# Patient Record
Sex: MALE | Race: WHITE | NOT HISPANIC OR LATINO
[De-identification: names, ages, dates, MRNs, and addresses within clinical notes are randomized per-mention and may not be internally consistent; named-entity substitution may affect disease eponyms.]

---

## 2018-05-11 ENCOUNTER — APPOINTMENT (OUTPATIENT)
Dept: MRI IMAGING | Facility: CLINIC | Age: 18
End: 2018-05-11
Payer: COMMERCIAL

## 2018-05-11 ENCOUNTER — APPOINTMENT (OUTPATIENT)
Dept: RADIOLOGY | Facility: CLINIC | Age: 18
End: 2018-05-11
Payer: COMMERCIAL

## 2018-05-11 ENCOUNTER — OUTPATIENT (OUTPATIENT)
Dept: OUTPATIENT SERVICES | Facility: HOSPITAL | Age: 18
LOS: 1 days | End: 2018-05-11
Payer: COMMERCIAL

## 2018-05-11 DIAGNOSIS — Z00.8 ENCOUNTER FOR OTHER GENERAL EXAMINATION: ICD-10-CM

## 2018-05-11 PROCEDURE — 77002 NEEDLE LOCALIZATION BY XRAY: CPT

## 2018-05-11 PROCEDURE — 23350 INJECTION FOR SHOULDER X-RAY: CPT

## 2018-05-11 PROCEDURE — 73222 MRI JOINT UPR EXTREM W/DYE: CPT | Mod: 26,RT

## 2018-05-11 PROCEDURE — 73222 MRI JOINT UPR EXTREM W/DYE: CPT

## 2018-05-11 PROCEDURE — 77002 NEEDLE LOCALIZATION BY XRAY: CPT | Mod: 26

## 2018-12-13 ENCOUNTER — EMERGENCY (EMERGENCY)
Facility: HOSPITAL | Age: 18
LOS: 1 days | Discharge: HOME | End: 2018-12-13
Attending: EMERGENCY MEDICINE
Payer: COMMERCIAL

## 2018-12-13 VITALS
OXYGEN SATURATION: 98 % | TEMPERATURE: 99 F | RESPIRATION RATE: 24 BRPM | HEART RATE: 85 BPM | SYSTOLIC BLOOD PRESSURE: 133 MMHG | DIASTOLIC BLOOD PRESSURE: 77 MMHG

## 2018-12-13 VITALS
TEMPERATURE: 98 F | OXYGEN SATURATION: 99 % | DIASTOLIC BLOOD PRESSURE: 78 MMHG | HEART RATE: 87 BPM | RESPIRATION RATE: 18 BRPM | SYSTOLIC BLOOD PRESSURE: 128 MMHG

## 2018-12-13 LAB
ALBUMIN SERPL ELPH-MCNC: 4.4 G/DL — SIGNIFICANT CHANGE UP (ref 3.3–5)
ALP SERPL-CCNC: 57 U/L — SIGNIFICANT CHANGE UP (ref 40–120)
ALT FLD-CCNC: 15 U/L — SIGNIFICANT CHANGE UP (ref 10–45)
ANION GAP SERPL CALC-SCNC: 14 MMOL/L — SIGNIFICANT CHANGE UP (ref 5–17)
ANION GAP SERPL CALC-SCNC: 19 MMOL/L — HIGH (ref 5–17)
AST SERPL-CCNC: 22 U/L — SIGNIFICANT CHANGE UP (ref 10–40)
BASOPHILS # BLD AUTO: 0 K/UL — SIGNIFICANT CHANGE UP (ref 0–0.2)
BASOPHILS NFR BLD AUTO: 0.2 % — SIGNIFICANT CHANGE UP (ref 0–2)
BILIRUB SERPL-MCNC: 0.7 MG/DL — SIGNIFICANT CHANGE UP (ref 0.2–1.2)
BUN SERPL-MCNC: 12 MG/DL — SIGNIFICANT CHANGE UP (ref 7–23)
BUN SERPL-MCNC: 14 MG/DL — SIGNIFICANT CHANGE UP (ref 7–23)
CALCIUM SERPL-MCNC: 10.1 MG/DL — SIGNIFICANT CHANGE UP (ref 8.4–10.5)
CALCIUM SERPL-MCNC: 8.7 MG/DL — SIGNIFICANT CHANGE UP (ref 8.4–10.5)
CHLORIDE SERPL-SCNC: 103 MMOL/L — SIGNIFICANT CHANGE UP (ref 96–108)
CHLORIDE SERPL-SCNC: 108 MMOL/L — SIGNIFICANT CHANGE UP (ref 96–108)
CO2 SERPL-SCNC: 18 MMOL/L — LOW (ref 22–31)
CO2 SERPL-SCNC: 19 MMOL/L — LOW (ref 22–31)
CREAT SERPL-MCNC: 0.83 MG/DL — SIGNIFICANT CHANGE UP (ref 0.5–1.3)
CREAT SERPL-MCNC: 0.87 MG/DL — SIGNIFICANT CHANGE UP (ref 0.5–1.3)
EOSINOPHIL # BLD AUTO: 0.1 K/UL — SIGNIFICANT CHANGE UP (ref 0–0.5)
EOSINOPHIL NFR BLD AUTO: 0.5 % — SIGNIFICANT CHANGE UP (ref 0–6)
GAS PNL BLDV: SIGNIFICANT CHANGE UP
GAS PNL BLDV: SIGNIFICANT CHANGE UP
GLUCOSE SERPL-MCNC: 113 MG/DL — HIGH (ref 70–99)
GLUCOSE SERPL-MCNC: 117 MG/DL — HIGH (ref 70–99)
HCT VFR BLD CALC: 44.1 % — SIGNIFICANT CHANGE UP (ref 39–50)
HGB BLD-MCNC: 15.3 G/DL — SIGNIFICANT CHANGE UP (ref 13–17)
LIDOCAIN IGE QN: 23 U/L — SIGNIFICANT CHANGE UP (ref 7–60)
LYMPHOCYTES # BLD AUTO: 1.4 K/UL — SIGNIFICANT CHANGE UP (ref 1–3.3)
LYMPHOCYTES # BLD AUTO: 14.1 % — SIGNIFICANT CHANGE UP (ref 13–44)
MCHC RBC-ENTMCNC: 31.5 PG — SIGNIFICANT CHANGE UP (ref 27–34)
MCHC RBC-ENTMCNC: 34.7 GM/DL — SIGNIFICANT CHANGE UP (ref 32–36)
MCV RBC AUTO: 90.8 FL — SIGNIFICANT CHANGE UP (ref 80–100)
MONOCYTES # BLD AUTO: 1.2 K/UL — HIGH (ref 0–0.9)
MONOCYTES NFR BLD AUTO: 12.8 % — SIGNIFICANT CHANGE UP (ref 2–14)
NEUTROPHILS # BLD AUTO: 7.1 K/UL — SIGNIFICANT CHANGE UP (ref 1.8–7.4)
NEUTROPHILS NFR BLD AUTO: 72.4 % — SIGNIFICANT CHANGE UP (ref 43–77)
PLATELET # BLD AUTO: 357 K/UL — SIGNIFICANT CHANGE UP (ref 150–400)
POTASSIUM SERPL-MCNC: 3.5 MMOL/L — SIGNIFICANT CHANGE UP (ref 3.5–5.3)
POTASSIUM SERPL-MCNC: 4 MMOL/L — SIGNIFICANT CHANGE UP (ref 3.5–5.3)
POTASSIUM SERPL-SCNC: 3.5 MMOL/L — SIGNIFICANT CHANGE UP (ref 3.5–5.3)
POTASSIUM SERPL-SCNC: 4 MMOL/L — SIGNIFICANT CHANGE UP (ref 3.5–5.3)
PROT SERPL-MCNC: 8.1 G/DL — SIGNIFICANT CHANGE UP (ref 6–8.3)
RBC # BLD: 4.85 M/UL — SIGNIFICANT CHANGE UP (ref 4.2–5.8)
RBC # FLD: 12.3 % — SIGNIFICANT CHANGE UP (ref 10.3–14.5)
SODIUM SERPL-SCNC: 140 MMOL/L — SIGNIFICANT CHANGE UP (ref 135–145)
SODIUM SERPL-SCNC: 141 MMOL/L — SIGNIFICANT CHANGE UP (ref 135–145)
WBC # BLD: 9.8 K/UL — SIGNIFICANT CHANGE UP (ref 3.8–10.5)
WBC # FLD AUTO: 9.8 K/UL — SIGNIFICANT CHANGE UP (ref 3.8–10.5)

## 2018-12-13 PROCEDURE — 83605 ASSAY OF LACTIC ACID: CPT

## 2018-12-13 PROCEDURE — 74177 CT ABD & PELVIS W/CONTRAST: CPT

## 2018-12-13 PROCEDURE — 85014 HEMATOCRIT: CPT

## 2018-12-13 PROCEDURE — 80048 BASIC METABOLIC PNL TOTAL CA: CPT

## 2018-12-13 PROCEDURE — 84295 ASSAY OF SERUM SODIUM: CPT

## 2018-12-13 PROCEDURE — 82565 ASSAY OF CREATININE: CPT

## 2018-12-13 PROCEDURE — 70450 CT HEAD/BRAIN W/O DYE: CPT | Mod: 26

## 2018-12-13 PROCEDURE — 82435 ASSAY OF BLOOD CHLORIDE: CPT

## 2018-12-13 PROCEDURE — 74177 CT ABD & PELVIS W/CONTRAST: CPT | Mod: 26

## 2018-12-13 PROCEDURE — 96361 HYDRATE IV INFUSION ADD-ON: CPT

## 2018-12-13 PROCEDURE — 82803 BLOOD GASES ANY COMBINATION: CPT

## 2018-12-13 PROCEDURE — 96375 TX/PRO/DX INJ NEW DRUG ADDON: CPT

## 2018-12-13 PROCEDURE — 83690 ASSAY OF LIPASE: CPT

## 2018-12-13 PROCEDURE — 85027 COMPLETE CBC AUTOMATED: CPT

## 2018-12-13 PROCEDURE — 99284 EMERGENCY DEPT VISIT MOD MDM: CPT

## 2018-12-13 PROCEDURE — 96374 THER/PROPH/DIAG INJ IV PUSH: CPT | Mod: XU

## 2018-12-13 PROCEDURE — 71046 X-RAY EXAM CHEST 2 VIEWS: CPT | Mod: 26

## 2018-12-13 PROCEDURE — 71046 X-RAY EXAM CHEST 2 VIEWS: CPT

## 2018-12-13 PROCEDURE — 70450 CT HEAD/BRAIN W/O DYE: CPT

## 2018-12-13 PROCEDURE — 80053 COMPREHEN METABOLIC PANEL: CPT

## 2018-12-13 PROCEDURE — 99284 EMERGENCY DEPT VISIT MOD MDM: CPT | Mod: 25

## 2018-12-13 PROCEDURE — 84132 ASSAY OF SERUM POTASSIUM: CPT

## 2018-12-13 PROCEDURE — 82330 ASSAY OF CALCIUM: CPT

## 2018-12-13 PROCEDURE — 82947 ASSAY GLUCOSE BLOOD QUANT: CPT

## 2018-12-13 RX ORDER — DIPHENHYDRAMINE HCL 50 MG
25 CAPSULE ORAL ONCE
Qty: 0 | Refills: 0 | Status: COMPLETED | OUTPATIENT
Start: 2018-12-13 | End: 2018-12-13

## 2018-12-13 RX ORDER — ONDANSETRON 8 MG/1
4 TABLET, FILM COATED ORAL ONCE
Qty: 0 | Refills: 0 | Status: COMPLETED | OUTPATIENT
Start: 2018-12-13 | End: 2018-12-13

## 2018-12-13 RX ORDER — SODIUM CHLORIDE 9 MG/ML
1000 INJECTION INTRAMUSCULAR; INTRAVENOUS; SUBCUTANEOUS ONCE
Qty: 0 | Refills: 0 | Status: COMPLETED | OUTPATIENT
Start: 2018-12-13 | End: 2018-12-13

## 2018-12-13 RX ORDER — METOCLOPRAMIDE HCL 10 MG
10 TABLET ORAL ONCE
Qty: 0 | Refills: 0 | Status: COMPLETED | OUTPATIENT
Start: 2018-12-13 | End: 2018-12-13

## 2018-12-13 RX ORDER — ACETAMINOPHEN 500 MG
1000 TABLET ORAL ONCE
Qty: 0 | Refills: 0 | Status: COMPLETED | OUTPATIENT
Start: 2018-12-13 | End: 2018-12-13

## 2018-12-13 RX ORDER — FAMOTIDINE 10 MG/ML
20 INJECTION INTRAVENOUS ONCE
Qty: 0 | Refills: 0 | Status: COMPLETED | OUTPATIENT
Start: 2018-12-13 | End: 2018-12-13

## 2018-12-13 RX ADMIN — Medication 1000 MILLIGRAM(S): at 15:48

## 2018-12-13 RX ADMIN — Medication 10 MILLIGRAM(S): at 14:12

## 2018-12-13 RX ADMIN — SODIUM CHLORIDE 1000 MILLILITER(S): 9 INJECTION INTRAMUSCULAR; INTRAVENOUS; SUBCUTANEOUS at 15:45

## 2018-12-13 RX ADMIN — ONDANSETRON 4 MILLIGRAM(S): 8 TABLET, FILM COATED ORAL at 16:02

## 2018-12-13 RX ADMIN — SODIUM CHLORIDE 1000 MILLILITER(S): 9 INJECTION INTRAMUSCULAR; INTRAVENOUS; SUBCUTANEOUS at 14:14

## 2018-12-13 RX ADMIN — SODIUM CHLORIDE 1000 MILLILITER(S): 9 INJECTION INTRAMUSCULAR; INTRAVENOUS; SUBCUTANEOUS at 15:48

## 2018-12-13 RX ADMIN — Medication 400 MILLIGRAM(S): at 14:14

## 2018-12-13 RX ADMIN — FAMOTIDINE 20 MILLIGRAM(S): 10 INJECTION INTRAVENOUS at 15:45

## 2018-12-13 RX ADMIN — Medication 25 MILLIGRAM(S): at 14:13

## 2018-12-13 NOTE — ED ADULT NURSE NOTE - NSIMPLEMENTINTERV_GEN_ALL_ED
Implemented All Universal Safety Interventions:  [place] to call system. Call bell, personal items and telephone within reach. Instruct patient to call for assistance. Room bathroom lighting operational. Non-slip footwear when patient is off stretcher. Physically safe environment: no spills, clutter or unnecessary equipment. Stretcher in lowest position, wheels locked, appropriate side rails in place.
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
## 2018-12-13 NOTE — ED PROVIDER NOTE - ATTENDING CONTRIBUTION TO CARE
I performed a history and physical exam of the patient and discussed their management with the resident. I reviewed the resident's note and agree with the documented findings and plan of care.  Deborah Rice MD
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
## 2018-12-13 NOTE — ED PROVIDER NOTE - NSFOLLOWUPINSTRUCTIONS_ED_ALL_ED_FT
Follow up with doctor  Return tot he ED if persistent headache, abdominal pain, or any other concerns  Take pepcid 20 mg twice per day  Take the zofran as prescribed by the urgent care
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
## 2018-12-13 NOTE — ED PROVIDER NOTE - CARE PLAN
Principal Discharge DX:	Nonintractable episodic headache, unspecified headache type  Secondary Diagnosis:	Intractable vomiting with nausea, unspecified vomiting type

## 2020-07-05 ENCOUNTER — EMERGENCY (EMERGENCY)
Facility: HOSPITAL | Age: 20
LOS: 1 days | Discharge: HOME | End: 2020-07-05
Attending: EMERGENCY MEDICINE
Payer: COMMERCIAL

## 2020-07-05 VITALS
HEART RATE: 67 BPM | RESPIRATION RATE: 17 BRPM | TEMPERATURE: 99 F | SYSTOLIC BLOOD PRESSURE: 123 MMHG | DIASTOLIC BLOOD PRESSURE: 60 MMHG | OXYGEN SATURATION: 100 %

## 2020-07-05 VITALS
WEIGHT: 169.98 LBS | HEIGHT: 68 IN | TEMPERATURE: 99 F | HEART RATE: 100 BPM | SYSTOLIC BLOOD PRESSURE: 138 MMHG | RESPIRATION RATE: 18 BRPM | DIASTOLIC BLOOD PRESSURE: 85 MMHG | OXYGEN SATURATION: 96 %

## 2020-07-05 LAB
ALBUMIN SERPL ELPH-MCNC: 4.9 G/DL — SIGNIFICANT CHANGE UP (ref 3.3–5)
ALP SERPL-CCNC: 67 U/L — SIGNIFICANT CHANGE UP (ref 40–120)
ALT FLD-CCNC: 14 U/L — SIGNIFICANT CHANGE UP (ref 10–45)
ANION GAP SERPL CALC-SCNC: 16 MMOL/L — SIGNIFICANT CHANGE UP (ref 5–17)
APAP SERPL-MCNC: <15 UG/ML — SIGNIFICANT CHANGE UP (ref 10–30)
AST SERPL-CCNC: 16 U/L — SIGNIFICANT CHANGE UP (ref 10–40)
BASOPHILS # BLD AUTO: 0.07 K/UL — SIGNIFICANT CHANGE UP (ref 0–0.2)
BASOPHILS NFR BLD AUTO: 0.9 % — SIGNIFICANT CHANGE UP (ref 0–2)
BILIRUB SERPL-MCNC: 1.1 MG/DL — SIGNIFICANT CHANGE UP (ref 0.2–1.2)
BUN SERPL-MCNC: 26 MG/DL — HIGH (ref 7–23)
CALCIUM SERPL-MCNC: 9.9 MG/DL — SIGNIFICANT CHANGE UP (ref 8.4–10.5)
CHLORIDE SERPL-SCNC: 82 MMOL/L — LOW (ref 96–108)
CO2 SERPL-SCNC: 31 MMOL/L — SIGNIFICANT CHANGE UP (ref 22–31)
CREAT SERPL-MCNC: 1.11 MG/DL — SIGNIFICANT CHANGE UP (ref 0.5–1.3)
EOSINOPHIL # BLD AUTO: 0.06 K/UL — SIGNIFICANT CHANGE UP (ref 0–0.5)
EOSINOPHIL NFR BLD AUTO: 0.8 % — SIGNIFICANT CHANGE UP (ref 0–6)
ETHANOL SERPL-MCNC: SIGNIFICANT CHANGE UP MG/DL (ref 0–10)
GAS PNL BLDV: SIGNIFICANT CHANGE UP
GLUCOSE SERPL-MCNC: 143 MG/DL — HIGH (ref 70–99)
HCT VFR BLD CALC: 41.7 % — SIGNIFICANT CHANGE UP (ref 39–50)
HGB BLD-MCNC: 15.3 G/DL — SIGNIFICANT CHANGE UP (ref 13–17)
LIDOCAIN IGE QN: 28 U/L — SIGNIFICANT CHANGE UP (ref 7–60)
LITHIUM SERPL-MCNC: <.2 MMOL/L — LOW (ref 0.6–1.2)
LYMPHOCYTES # BLD AUTO: 1.19 K/UL — SIGNIFICANT CHANGE UP (ref 1–3.3)
LYMPHOCYTES # BLD AUTO: 14.8 % — SIGNIFICANT CHANGE UP (ref 13–44)
MCHC RBC-ENTMCNC: 30.8 PG — SIGNIFICANT CHANGE UP (ref 27–34)
MCHC RBC-ENTMCNC: 36.7 GM/DL — HIGH (ref 32–36)
MCV RBC AUTO: 83.9 FL — SIGNIFICANT CHANGE UP (ref 80–100)
MONOCYTES # BLD AUTO: 1.12 K/UL — HIGH (ref 0–0.9)
MONOCYTES NFR BLD AUTO: 13.9 % — SIGNIFICANT CHANGE UP (ref 2–14)
NEUTROPHILS # BLD AUTO: 5.62 K/UL — SIGNIFICANT CHANGE UP (ref 1.8–7.4)
NEUTROPHILS NFR BLD AUTO: 68.7 % — SIGNIFICANT CHANGE UP (ref 43–77)
PLATELET # BLD AUTO: 391 K/UL — SIGNIFICANT CHANGE UP (ref 150–400)
POTASSIUM SERPL-MCNC: 2.6 MMOL/L — CRITICAL LOW (ref 3.5–5.3)
POTASSIUM SERPL-SCNC: 2.6 MMOL/L — CRITICAL LOW (ref 3.5–5.3)
PROT SERPL-MCNC: 7.7 G/DL — SIGNIFICANT CHANGE UP (ref 6–8.3)
RBC # BLD: 4.97 M/UL — SIGNIFICANT CHANGE UP (ref 4.2–5.8)
RBC # FLD: 11.9 % — SIGNIFICANT CHANGE UP (ref 10.3–14.5)
SALICYLATES SERPL-MCNC: <2 MG/DL — LOW (ref 15–30)
SODIUM SERPL-SCNC: 129 MMOL/L — LOW (ref 135–145)
WBC # BLD: 8.07 K/UL — SIGNIFICANT CHANGE UP (ref 3.8–10.5)
WBC # FLD AUTO: 8.07 K/UL — SIGNIFICANT CHANGE UP (ref 3.8–10.5)

## 2020-07-05 PROCEDURE — 82947 ASSAY GLUCOSE BLOOD QUANT: CPT

## 2020-07-05 PROCEDURE — 99284 EMERGENCY DEPT VISIT MOD MDM: CPT | Mod: 25

## 2020-07-05 PROCEDURE — 84132 ASSAY OF SERUM POTASSIUM: CPT

## 2020-07-05 PROCEDURE — 83690 ASSAY OF LIPASE: CPT

## 2020-07-05 PROCEDURE — 93010 ELECTROCARDIOGRAM REPORT: CPT | Mod: 59

## 2020-07-05 PROCEDURE — 84295 ASSAY OF SERUM SODIUM: CPT

## 2020-07-05 PROCEDURE — 82803 BLOOD GASES ANY COMBINATION: CPT

## 2020-07-05 PROCEDURE — 96375 TX/PRO/DX INJ NEW DRUG ADDON: CPT

## 2020-07-05 PROCEDURE — 96374 THER/PROPH/DIAG INJ IV PUSH: CPT

## 2020-07-05 PROCEDURE — 80053 COMPREHEN METABOLIC PANEL: CPT

## 2020-07-05 PROCEDURE — 93005 ELECTROCARDIOGRAM TRACING: CPT

## 2020-07-05 PROCEDURE — 85027 COMPLETE CBC AUTOMATED: CPT

## 2020-07-05 PROCEDURE — 99285 EMERGENCY DEPT VISIT HI MDM: CPT

## 2020-07-05 PROCEDURE — 82330 ASSAY OF CALCIUM: CPT

## 2020-07-05 PROCEDURE — 85014 HEMATOCRIT: CPT

## 2020-07-05 PROCEDURE — 82435 ASSAY OF BLOOD CHLORIDE: CPT

## 2020-07-05 PROCEDURE — 80178 ASSAY OF LITHIUM: CPT

## 2020-07-05 PROCEDURE — 80307 DRUG TEST PRSMV CHEM ANLYZR: CPT

## 2020-07-05 PROCEDURE — 83605 ASSAY OF LACTIC ACID: CPT

## 2020-07-05 RX ORDER — SODIUM CHLORIDE 9 MG/ML
1000 INJECTION, SOLUTION INTRAVENOUS ONCE
Refills: 0 | Status: DISCONTINUED | OUTPATIENT
Start: 2020-07-05 | End: 2020-07-09

## 2020-07-05 RX ORDER — POTASSIUM CHLORIDE 20 MEQ
10 PACKET (EA) ORAL
Refills: 0 | Status: DISCONTINUED | OUTPATIENT
Start: 2020-07-05 | End: 2020-07-09

## 2020-07-05 RX ORDER — SODIUM CHLORIDE 9 MG/ML
1000 INJECTION INTRAMUSCULAR; INTRAVENOUS; SUBCUTANEOUS ONCE
Refills: 0 | Status: COMPLETED | OUTPATIENT
Start: 2020-07-05 | End: 2020-07-05

## 2020-07-05 RX ORDER — METOCLOPRAMIDE HCL 10 MG
10 TABLET ORAL ONCE
Refills: 0 | Status: COMPLETED | OUTPATIENT
Start: 2020-07-05 | End: 2020-07-05

## 2020-07-05 RX ORDER — ONDANSETRON 8 MG/1
4 TABLET, FILM COATED ORAL ONCE
Refills: 0 | Status: COMPLETED | OUTPATIENT
Start: 2020-07-05 | End: 2020-07-05

## 2020-07-05 RX ORDER — FAMOTIDINE 10 MG/ML
20 INJECTION INTRAVENOUS ONCE
Refills: 0 | Status: COMPLETED | OUTPATIENT
Start: 2020-07-05 | End: 2020-07-05

## 2020-07-05 RX ORDER — ONDANSETRON 8 MG/1
1 TABLET, FILM COATED ORAL
Qty: 6 | Refills: 0
Start: 2020-07-05 | End: 2020-07-07

## 2020-07-05 RX ORDER — POTASSIUM CHLORIDE 20 MEQ
40 PACKET (EA) ORAL ONCE
Refills: 0 | Status: COMPLETED | OUTPATIENT
Start: 2020-07-05 | End: 2020-07-05

## 2020-07-05 RX ADMIN — ONDANSETRON 4 MILLIGRAM(S): 8 TABLET, FILM COATED ORAL at 08:53

## 2020-07-05 RX ADMIN — Medication 10 MILLIGRAM(S): at 10:36

## 2020-07-05 RX ADMIN — Medication 40 MILLIEQUIVALENT(S): at 10:36

## 2020-07-05 RX ADMIN — Medication 100 MILLIEQUIVALENT(S): at 10:36

## 2020-07-05 RX ADMIN — FAMOTIDINE 20 MILLIGRAM(S): 10 INJECTION INTRAVENOUS at 11:17

## 2020-07-05 RX ADMIN — SODIUM CHLORIDE 1000 MILLILITER(S): 9 INJECTION INTRAMUSCULAR; INTRAVENOUS; SUBCUTANEOUS at 10:36

## 2020-07-05 RX ADMIN — SODIUM CHLORIDE 1000 MILLILITER(S): 9 INJECTION INTRAMUSCULAR; INTRAVENOUS; SUBCUTANEOUS at 08:53

## 2020-07-05 NOTE — ED ADULT TRIAGE NOTE - CHIEF COMPLAINT QUOTE
JULIO, Vomiting X1week, seen at UC, given Zofran with partial relief. No fevers, no diarrhea

## 2020-07-05 NOTE — ED PROVIDER NOTE - OBJECTIVE STATEMENT
18 yo M, pmhx of bipolar on lithium, pw vomiting x 8 days. Pt vomits 5-6 episodes daily, no blood in vomiting, no diarrhea. Endorse having trouble keeping fluids down in the past 2 days even with UC Rx PO zofran. No abdominal pain, no urinary symptoms, fever, chills. Endorse smoking marijuana 4x per week. no sick contacts.

## 2020-07-05 NOTE — ED PROVIDER NOTE - NSFOLLOWUPINSTRUCTIONS_ED_ALL_ED_FT
You were seen in the Emergency Department (ED) for nausea and vomiting.     You are prescribed Zofran ODT. Please take as directed by your pharmacists.   Please start by eating bland foods and drink PowerAide until you feel better.     Please follow up with your primary care doctor in the next 72 hours.  If you have issues obtaining follow up, please call: [phone] to obtain a doctor or specialist who takes your insurance in your area.    Please return to the ED if you experience any new or concerning symptoms, such as: passing out, unable to eat or drink, fever, chills.     Thank you for visiting a [facility] ED.

## 2020-07-05 NOTE — ED PROVIDER NOTE - PATIENT PORTAL LINK FT
You can access the FollowMyHealth Patient Portal offered by [facility] by registering at the following website: [email or web address]. By joining [facility]’s FollowMyHealth portal, you will also be able to view your health information using other applications (apps) compatible with our system.

## 2020-07-05 NOTE — ED PROVIDER NOTE - PHYSICAL EXAMINATION
GENl: Patient awake alert NAD.   HEENT: normocephalic, atraumatic, EOMI, no scleral icterus, + dry MM  CARDIAC: RRR, S1, S2, no murmur.   PULM: CTA B/L no wheeze, rhonchi, rales.   ABD: soft NT, ND, no rebound no guarding  MSK: Moving all extremities, no edema.     NEURO: A&Ox3, no focal neurological deficits, CN 2-12 grossly intact  SKIN: warm, dry, no rash.

## 2020-07-05 NOTE — ED ADULT NURSE NOTE - NS_ED_NURSE_TEACHING_TOPIC_ED_A_ED
Digestive/Pt discharged, VSS, afebrile, ambulating independently. Nurse clearly reviewed discharge instructions, followup care, and when to return to the ED - Pt verbalized understanding.

## 2020-07-05 NOTE — ED ADULT NURSE NOTE - OBJECTIVE STATEMENT
19 year old male presents to the ED complaining of NBNB vomiting x 8 days, multiple times/day, Pt denies fever, chills, SOB or cough at this time. Pt is A&O x 4, VSS, afebrile, ambulating independently. Pt denies fever, chills, SOB, or chest pain.

## 2020-07-05 NOTE — ED PROVIDER NOTE - NS ED ROS FT
GENERAL: No fever, chills  EYES: no vision changes, no discharge.   ENT: no difficulty swallowing or speaking   CARDIAC: no chest pain/pressure  PULMONARY: no cough, SOB  GI: no abdominal pain, + n/v, no d  : no dysuria  SKIN: no rashes  NEURO: no headache  MSK: No joint pain, myalgia.

## 2020-07-05 NOTE — ED PROVIDER NOTE - PROGRESS NOTE DETAILS
Attending note (Eduardo): still having nausea; noted on labs to have hypokalemia; ecg shows u wavs c/w hypokalemia, ordering addiitonal medicaiton (reglan), supplemental potassium (oral and iv) and will r/a.  Noted to have enlarged platelets on cbc diff but no other gross abnormalities, will recommend outpatient f/u with hematology. Kate Bernabe M.D. Resident  Pt reassessed, Potassium going and still not feeling at baseline. Possible CDU if no improvement, pt agreeable to plan. Kate Bernabe M.D. Resident  PT reassessed, feels better after fluids. Eating saltines and water. States he feels well enough to go home and plan to have some chicken broth at home. Rx ODT zofran sent to pharmacy.

## 2020-07-05 NOTE — ED PROVIDER NOTE - ATTENDING CONTRIBUTION TO CARE
Attending Statement (BRIAN Clark MD):    HPI: 20y/o M with h/o prior episodes of severe vomiting (as per patient), presenting with vomiting for the past 5 days; nausea, vomiting (nonbloody/nonbilious) and abdominal discomfort (indicates area above umbilicus and states it is a mild discomfort that occurs immediately after episode of vomiting and then resolves few minutes after). No lower abdominal pain, no R or L abdominal/flank pain.  No rash.  No change in diet; though does admit drinking alcohol last week (unclear amount).  No fever/chills, no diarrhea (states only few small bowel movements over past few days).  No h/o abdominal surgery (remote h/o surgery to correct hypospadia as infant).  No change in recent medications (on seroquel, ativan and xanax for h/o anxiety and bipolar d/o).      Review of Systems:  -General: no fever or chills  -ENT: no congestion, no difficulty swallowing  -Pulmonary: no cough, no shortness of breath  -Cardiac: no chest pain, no palpitations  -Gastrointestinal: +adominal pain, +nausea, +vomiting, and no diarrhea.  -Genitourinary: no blood or pain with urination  -Musculoskeletal: no back or neck pain  -Skin: no rashes  -Endocrine: No h/o diabetes or thyroid disease  -Neurologic: No focal weakness or numbness    PSH/PMH as noted above    On Physical Exam:  General: well appearing, in NAD, speaking clearly in full sentences and without difficulty; cooperative with exam  HEENT: PERRL, MMM  Neck: no neck tenderness, no nuchal rigidity  Cardiac: normal s1, s2; RRR; no MGR  Lungs: CTABL  Abdomen: soft nontender/nondistended; no CVA tenderness  : no bladder tenderness or distension  Skin: intact, no rash  Extremities: no peripheral edema, no gross deformities  Neuro: no gross neurologic deficits    MDM: 20y/o M w/ h/o prior vomiting (? cyclic vomiting) presenting with vomiting x 5 days; on exam, is afebrile, in NAD, MMM; abdomen is soft nt/nd.  Vomiting without significant tenderness, possible gastritis/gerd vs cyclic vomiting vs gastroenteritis (though no diarrhea); low concern for surgical pathology given presentation; will obtain labs: cbc (eval for anemia or leukocytosis), CMP (to evaluate for electrolyte abnormalities or renal/liver dysfunction), lipase (r/o pancreatitis), vbg/lactate (eval for acidosis, elevated lactate) and check lithium level (is not on any more per report but still on med list in EMR).  IV fluids and antiemetics as needed, and reassess after initial labs return.

## 2020-07-05 NOTE — ED PROVIDER NOTE - CLINICAL SUMMARY MEDICAL DECISION MAKING FREE TEXT BOX
Young adult male hx of bipolar on lithium, pw nausea and vomiting x 8 days. no abd pain, no diarrhea. no sick contacts. no tender abdomen. Possible gastritis vs cyclic vomiting, vs cannabinoid hyperemesis, concern for electrolyte disturbance. basic labs, lithium level, anti-emetic, likely DC home.

## 2020-07-25 ENCOUNTER — INPATIENT (INPATIENT)
Facility: HOSPITAL | Age: 20
LOS: 4 days | Discharge: HOME | End: 2020-07-30
Attending: PSYCHIATRY & NEUROLOGY | Admitting: PSYCHIATRY & NEUROLOGY
Payer: COMMERCIAL

## 2020-07-25 VITALS
OXYGEN SATURATION: 95 % | TEMPERATURE: 98 F | HEART RATE: 80 BPM | DIASTOLIC BLOOD PRESSURE: 62 MMHG | RESPIRATION RATE: 18 BRPM | SYSTOLIC BLOOD PRESSURE: 116 MMHG

## 2020-07-25 LAB
ALBUMIN SERPL ELPH-MCNC: 4.6 G/DL — SIGNIFICANT CHANGE UP (ref 3.3–5)
ALP SERPL-CCNC: 61 U/L — SIGNIFICANT CHANGE UP (ref 40–120)
ALT FLD-CCNC: 12 U/L — SIGNIFICANT CHANGE UP (ref 4–41)
AMPHET UR-MCNC: NEGATIVE — SIGNIFICANT CHANGE UP
ANION GAP SERPL CALC-SCNC: 16 MMO/L — HIGH (ref 7–14)
APAP SERPL-MCNC: < 15 UG/ML — LOW (ref 15–25)
APPEARANCE UR: CLEAR — SIGNIFICANT CHANGE UP
AST SERPL-CCNC: 14 U/L — SIGNIFICANT CHANGE UP (ref 4–40)
BACTERIA # UR AUTO: NEGATIVE — SIGNIFICANT CHANGE UP
BARBITURATES UR SCN-MCNC: NEGATIVE — SIGNIFICANT CHANGE UP
BASOPHILS # BLD AUTO: 0.04 K/UL — SIGNIFICANT CHANGE UP (ref 0–0.2)
BASOPHILS NFR BLD AUTO: 0.4 % — SIGNIFICANT CHANGE UP (ref 0–2)
BENZODIAZ UR-MCNC: NEGATIVE — SIGNIFICANT CHANGE UP
BILIRUB SERPL-MCNC: 0.5 MG/DL — SIGNIFICANT CHANGE UP (ref 0.2–1.2)
BILIRUB UR-MCNC: NEGATIVE — SIGNIFICANT CHANGE UP
BLOOD UR QL VISUAL: NEGATIVE — SIGNIFICANT CHANGE UP
BUN SERPL-MCNC: 14 MG/DL — SIGNIFICANT CHANGE UP (ref 7–23)
CALCIUM SERPL-MCNC: 9.6 MG/DL — SIGNIFICANT CHANGE UP (ref 8.4–10.5)
CANNABINOIDS UR-MCNC: POSITIVE — SIGNIFICANT CHANGE UP
CHLORIDE SERPL-SCNC: 92 MMOL/L — LOW (ref 98–107)
CO2 SERPL-SCNC: 29 MMOL/L — SIGNIFICANT CHANGE UP (ref 22–31)
COCAINE METAB.OTHER UR-MCNC: NEGATIVE — SIGNIFICANT CHANGE UP
COLOR SPEC: YELLOW — SIGNIFICANT CHANGE UP
CREAT SERPL-MCNC: 1.05 MG/DL — SIGNIFICANT CHANGE UP (ref 0.5–1.3)
EOSINOPHIL # BLD AUTO: 0.03 K/UL — SIGNIFICANT CHANGE UP (ref 0–0.5)
EOSINOPHIL NFR BLD AUTO: 0.3 % — SIGNIFICANT CHANGE UP (ref 0–6)
ETHANOL BLD-MCNC: < 10 MG/DL — SIGNIFICANT CHANGE UP
GLUCOSE SERPL-MCNC: 124 MG/DL — HIGH (ref 70–99)
GLUCOSE UR-MCNC: NEGATIVE — SIGNIFICANT CHANGE UP
HCT VFR BLD CALC: 39.2 % — SIGNIFICANT CHANGE UP (ref 39–50)
HGB BLD-MCNC: 13.7 G/DL — SIGNIFICANT CHANGE UP (ref 13–17)
HYALINE CASTS # UR AUTO: HIGH
IMM GRANULOCYTES NFR BLD AUTO: 0.5 % — SIGNIFICANT CHANGE UP (ref 0–1.5)
KETONES UR-MCNC: NEGATIVE — SIGNIFICANT CHANGE UP
LEUKOCYTE ESTERASE UR-ACNC: NEGATIVE — SIGNIFICANT CHANGE UP
LYMPHOCYTES # BLD AUTO: 1.3 K/UL — SIGNIFICANT CHANGE UP (ref 1–3.3)
LYMPHOCYTES # BLD AUTO: 12.9 % — LOW (ref 13–44)
MCHC RBC-ENTMCNC: 30.9 PG — SIGNIFICANT CHANGE UP (ref 27–34)
MCHC RBC-ENTMCNC: 34.9 % — SIGNIFICANT CHANGE UP (ref 32–36)
MCV RBC AUTO: 88.5 FL — SIGNIFICANT CHANGE UP (ref 80–100)
METHADONE UR-MCNC: NEGATIVE — SIGNIFICANT CHANGE UP
MONOCYTES # BLD AUTO: 0.85 K/UL — SIGNIFICANT CHANGE UP (ref 0–0.9)
MONOCYTES NFR BLD AUTO: 8.4 % — SIGNIFICANT CHANGE UP (ref 2–14)
NEUTROPHILS # BLD AUTO: 7.81 K/UL — HIGH (ref 1.8–7.4)
NEUTROPHILS NFR BLD AUTO: 77.5 % — HIGH (ref 43–77)
NITRITE UR-MCNC: NEGATIVE — SIGNIFICANT CHANGE UP
NRBC # FLD: 0 K/UL — SIGNIFICANT CHANGE UP (ref 0–0)
OPIATES UR-MCNC: NEGATIVE — SIGNIFICANT CHANGE UP
OXYCODONE UR-MCNC: NEGATIVE — SIGNIFICANT CHANGE UP
PCP UR-MCNC: NEGATIVE — SIGNIFICANT CHANGE UP
PH UR: 8 — SIGNIFICANT CHANGE UP (ref 5–8)
PLATELET # BLD AUTO: 304 K/UL — SIGNIFICANT CHANGE UP (ref 150–400)
PMV BLD: 9.9 FL — SIGNIFICANT CHANGE UP (ref 7–13)
POTASSIUM SERPL-MCNC: 2.7 MMOL/L — CRITICAL LOW (ref 3.5–5.3)
POTASSIUM SERPL-SCNC: 2.7 MMOL/L — CRITICAL LOW (ref 3.5–5.3)
PROT SERPL-MCNC: 7.2 G/DL — SIGNIFICANT CHANGE UP (ref 6–8.3)
PROT UR-MCNC: 100 — HIGH
RBC # BLD: 4.43 M/UL — SIGNIFICANT CHANGE UP (ref 4.2–5.8)
RBC # FLD: 13.1 % — SIGNIFICANT CHANGE UP (ref 10.3–14.5)
RBC CASTS # UR COMP ASSIST: SIGNIFICANT CHANGE UP (ref 0–?)
SALICYLATES SERPL-MCNC: < 5 MG/DL — LOW (ref 15–30)
SARS-COV-2 RNA SPEC QL NAA+PROBE: SIGNIFICANT CHANGE UP
SODIUM SERPL-SCNC: 137 MMOL/L — SIGNIFICANT CHANGE UP (ref 135–145)
SP GR SPEC: 1.03 — SIGNIFICANT CHANGE UP (ref 1–1.04)
SQUAMOUS # UR AUTO: SIGNIFICANT CHANGE UP
TRANS CELLS #/AREA URNS HPF: SIGNIFICANT CHANGE UP
TSH SERPL-MCNC: 0.82 UIU/ML — SIGNIFICANT CHANGE UP (ref 0.27–4.2)
UROBILINOGEN FLD QL: SIGNIFICANT CHANGE UP
WBC # BLD: 10.08 K/UL — SIGNIFICANT CHANGE UP (ref 3.8–10.5)
WBC # FLD AUTO: 10.08 K/UL — SIGNIFICANT CHANGE UP (ref 3.8–10.5)
WBC UR QL: SIGNIFICANT CHANGE UP (ref 0–?)

## 2020-07-25 PROCEDURE — 99285 EMERGENCY DEPT VISIT HI MDM: CPT | Mod: GC

## 2020-07-25 RX ORDER — DOXAZOSIN MESYLATE 4 MG
6 TABLET ORAL AT BEDTIME
Refills: 0 | Status: DISCONTINUED | OUTPATIENT
Start: 2020-07-26 | End: 2020-07-30

## 2020-07-25 RX ORDER — SODIUM CHLORIDE 9 MG/ML
1000 INJECTION INTRAMUSCULAR; INTRAVENOUS; SUBCUTANEOUS
Refills: 0 | Status: DISCONTINUED | OUTPATIENT
Start: 2020-07-25 | End: 2020-07-26

## 2020-07-25 RX ORDER — ONDANSETRON 8 MG/1
4 TABLET, FILM COATED ORAL EVERY 6 HOURS
Refills: 0 | Status: DISCONTINUED | OUTPATIENT
Start: 2020-07-26 | End: 2020-07-30

## 2020-07-25 RX ORDER — QUETIAPINE FUMARATE 200 MG/1
200 TABLET, FILM COATED ORAL AT BEDTIME
Refills: 0 | Status: DISCONTINUED | OUTPATIENT
Start: 2020-07-25 | End: 2020-07-25

## 2020-07-25 RX ORDER — POTASSIUM CHLORIDE 20 MEQ
10 PACKET (EA) ORAL
Refills: 0 | Status: COMPLETED | OUTPATIENT
Start: 2020-07-25 | End: 2020-07-25

## 2020-07-25 RX ORDER — POTASSIUM CHLORIDE 20 MEQ
40 PACKET (EA) ORAL ONCE
Refills: 0 | Status: COMPLETED | OUTPATIENT
Start: 2020-07-25 | End: 2020-07-25

## 2020-07-25 RX ORDER — QUETIAPINE FUMARATE 200 MG/1
200 TABLET, FILM COATED ORAL AT BEDTIME
Refills: 0 | Status: DISCONTINUED | OUTPATIENT
Start: 2020-07-26 | End: 2020-07-27

## 2020-07-25 RX ADMIN — Medication 10 MILLIEQUIVALENT(S): at 23:35

## 2020-07-25 RX ADMIN — SODIUM CHLORIDE 1000 MILLILITER(S): 9 INJECTION INTRAMUSCULAR; INTRAVENOUS; SUBCUTANEOUS at 23:35

## 2020-07-25 RX ADMIN — SODIUM CHLORIDE 125 MILLILITER(S): 9 INJECTION INTRAMUSCULAR; INTRAVENOUS; SUBCUTANEOUS at 20:20

## 2020-07-25 RX ADMIN — Medication 100 MILLIEQUIVALENT(S): at 21:04

## 2020-07-25 RX ADMIN — Medication 40 MILLIEQUIVALENT(S): at 17:49

## 2020-07-25 RX ADMIN — Medication 100 MILLIEQUIVALENT(S): at 21:54

## 2020-07-25 RX ADMIN — Medication 10 MILLIEQUIVALENT(S): at 23:36

## 2020-07-25 RX ADMIN — Medication 10 MILLIEQUIVALENT(S): at 20:42

## 2020-07-25 RX ADMIN — Medication 100 MILLIEQUIVALENT(S): at 19:42

## 2020-07-25 NOTE — ED PROVIDER NOTE - PROGRESS NOTE DETAILS
Sergio:  patient signed out to me, pending repeat BMP to assess correction of hypokalemia.  K improved, spoke with psychiatrist, admit to [facility].

## 2020-07-25 NOTE — ED BEHAVIORAL HEALTH ASSESSMENT NOTE - PSYCHIATRIC ISSUES AND PLAN (INCLUDE STANDING AND PRN MEDICATION)
Bipolar NOS - haldo/ativan/benadryl prn Continue Seroquel 200mg qhs with plan to titrate, Ativan 1mg TID, Doxazosin 6mg daily, PRN haldo/ativan/benadryl q6h PO/IM for agitation
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
## 2020-07-25 NOTE — ED ADULT NURSE NOTE - NSIMPLEMENTINTERV_GEN_ALL_ED
Implemented All Fall Risk Interventions:  [place] to call system. Call bell, personal items and telephone within reach. Instruct patient to call for assistance. Room bathroom lighting operational. Non-slip footwear when patient is off stretcher. Physically safe environment: no spills, clutter or unnecessary equipment. Stretcher in lowest position, wheels locked, appropriate side rails in place. Provide visual cue, wrist band, yellow gown, etc. Monitor gait and stability. Monitor for mental status changes and reorient to person, place, and time. Review medications for side effects contributing to fall risk. Reinforce activity limits and safety measures with patient and family. DC instructions

## 2020-07-25 NOTE — ED BEHAVIORAL HEALTH ASSESSMENT NOTE - VIOLENCE RISK FACTORS:
Affective dysregulation/Feeling of being under threat and being unable to control threat/Substance abuse/Irritability/Impulsivity

## 2020-07-25 NOTE — ED BEHAVIORAL HEALTH ASSESSMENT NOTE - SUICIDE RISK FACTORS
Impulsivity/Hopelessness or despair/Current mood episode/Agitation/Severe Anxiety/Panic Anhedonia/Current mood episode/PTSD current/past/Impulsivity/Cluster B Personality disorders or traits current/past/Family History of psychiatric diagnoses requiring hospitalization/Mood Disorder current/past/Hopelessness or despair/Unable to engage in safety planning/Alcohol/Substance abuse disorders/Agitation/Severe Anxiety/Panic
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
## 2020-07-25 NOTE — ED ADULT NURSE NOTE - OBJECTIVE STATEMENT
Pt is a 19yo male brought in by EMS for possible SI. Pt’s mom called 911 him for expressing SI yesterday. Pt appears upset and tearful on arrival to ,  saying “I don’t want to be here”. Pt is denying SI upon arrival to , and does not want to talk to writer in details. Also denies HI, auditory visual hallucinations, and alcohol use. Admits to smoking weed occasionally. Denies any past attempts of suicide and denies any current plan for self-harm. Ongoing psych evaluation in progress. Pt is a 21yo male brought in by EMS for possible SI. Pt’s mom called 911 him for expressing SI yesterday. Pt appears upset and tearful on arrival to ,  saying “I don’t want to be here”. Pt is denying SI upon arrival to , and does not want to talk to writer in details. Also denies HI, auditory visual hallucinations, and alcohol use. Admits to smoking weed occasionally. Denies any past attempts of suicide and denies any current plan for self-harm. Ongoing psych evaluation in progress.  Pt brought to ER [facility] for Potassium infusions. IV lock olaced to right antecubital and infusion began.   SABRA Tolbert
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
## 2020-07-25 NOTE — ED BEHAVIORAL HEALTH ASSESSMENT NOTE - RISK ASSESSMENT
Risk factors for harm to self include depression (possible mixed episode), suicidal ideation with intent and plan - to jump off a ledge although he is guarded about it when asked here, a possible suicide attempt yesterday,  anxiety,  and impulsivity. -- very high risk Risk factors for harm to self and others include bipolar, depression (possible mixed episode), suicidal ideation, poor impulse control, erratic/dangerous behavior, substance abuse, poor insight into need for treatment. Protective factors include that patient has good family supports and is engaged in treatment. Currently Moderate Acute Suicide Risk

## 2020-07-25 NOTE — ED BEHAVIORAL HEALTH ASSESSMENT NOTE - DIFFERENTIAL
bipolar nos vs major depressive disorder; social anxiety; parent child relational problem bipolar I d/o, current episode mixed  impulse control d/o  cluster B personality traits
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
## 2020-07-25 NOTE — ED BEHAVIORAL HEALTH ASSESSMENT NOTE - FAMILY HISTORY OF SUBSTANCE ABUSE
69 y/o female with pmh of hypothyroid on synthroid presents for evaluation of body aches, shortness of breath and decreased energy.  4 days of body aches. yesterday with shortness of breth that is worse at night.   Fever within the past 4 days  Today with increased shortness of breath.   No passing out. no vomiting.  Complaint of mild abdominal pain
None known

## 2020-07-25 NOTE — ED PROVIDER NOTE - OBJECTIVE STATEMENT
21 y/o M hx Bipolar D/O  BIBA  w c/o depression and suicidal  ideations. Admits to multiple social stressors.  Denies HI/AH/VH.  Denies falling, punching or kicking any objects. Denies pain, SOB, dizziness  fever, chills  chest/ abdominal discomfort. Denies recent use of alcohol or illicit drugs. Medications offered. No evidence of physical injuries, broken  skin or deformities. 19 y/o M hx Bipolar D/O , Gastritis, Ulcerative Colitis  BIBA  w c/o depression and suicidal  ideations. Admits to multiple social stressors.  Denies HI/AH/VH.  Denies falling, punching or kicking any objects.  Patient admits to chronic vomiting and retching , last episodes 2 days ago. Currently takes Zofran 4 mg PRN .  Denies pain, SOB, dizziness  fever, chills  chest/ abdominal discomfort. Denies recent use of alcohol or illicit drugs. Medications offered. No evidence of physical injuries, broken  skin or deformities.

## 2020-07-25 NOTE — ED PROVIDER NOTE - CLINICAL SUMMARY MEDICAL DECISION MAKING FREE TEXT BOX
19 y/o M hx Bipolar D/O 21 y/o M hx Bipolar D/O  Labs, Urine Tox/UA, EKG . Hypokalemia secondary to   chronic vomiting and retching , last episodes 2 days ago.  VIMAL gil . Recheck CMP. Once medically cleared, admit to [facility] . 19 y/o M hx Bipolar D/O  Labs, Urine Tox/UA, EKG . Hypokalemia secondary to   chronic vomiting and retching , last episodes 2 days ago.  IV K riders . Recheck CMP. Once medically cleared, admit to [facility] Patient [facility] Unit .  Psychiatric consult called. Medical evaluation performed. There is no clinical evidence of intoxication or any acute medical problem requiring immediate intervention.
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
## 2020-07-25 NOTE — ED BEHAVIORAL HEALTH ASSESSMENT NOTE - SUICIDE PROTECTIVE FACTORS
Engaged in work or school/Positive therapeutic relationships/Identifies reasons for living/Has future plans

## 2020-07-25 NOTE — ED ADULT TRIAGE NOTE - CHIEF COMPLAINT QUOTE
pts mother called 911 d/t yesterday reporting SI. pt will not elaborate on plan or intent but denies self harm.

## 2020-07-25 NOTE — ED BEHAVIORAL HEALTH ASSESSMENT NOTE - ACTIVATING EVENTS/STRESSORS
Current or pending social isolation/Other/Triggering events leading to humiliation, shame, and/or despair (e.g. Loss of relationship, financial or health status) (real or anticipated) Change in provider or treatment (i.e., medications, psychotherapy, milieu)/Current or pending social isolation/Other/Triggering events leading to humiliation, shame, and/or despair (e.g. Loss of relationship, financial or health status) (real or anticipated)/Perceived burden on family or others

## 2020-07-25 NOTE — ED BEHAVIORAL HEALTH ASSESSMENT NOTE - DESCRIPTION
Vital Signs Last 24 Hrs  T(C): 36.9 (25 Jul 2020 14:01), Max: 36.9 (25 Jul 2020 14:01)  T(F): 98.5 (25 Jul 2020 14:01), Max: 98.5 (25 Jul 2020 14:01)  HR: 80 (25 Jul 2020 14:01) (80 - 80)  BP: 116/62 (25 Jul 2020 14:01) (116/62 - 116/62)  BP(mean): --  RR: 18 (25 Jul 2020 14:01) (18 - 18)  SpO2: 95% (25 Jul 2020 14:01) (95% - 95%) none 11th grade in [employer] Graduated college, considering MA grad program in Communication, Business Patient tearful, anxious, distressed in ED, cooperative with interview.     Vital Signs Last 24 Hrs  T(C): 36.9 (25 Jul 2020 14:01), Max: 36.9 (25 Jul 2020 14:01)  T(F): 98.5 (25 Jul 2020 14:01), Max: 98.5 (25 Jul 2020 14:01)  HR: 80 (25 Jul 2020 14:01) (80 - 80)  BP: 116/62 (25 Jul 2020 14:01) (116/62 - 116/62)  BP(mean): --  RR: 18 (25 Jul 2020 14:01) (18 - 18)  SpO2: 95% (25 Jul 2020 14:01) (95% - 95%)

## 2020-07-26 DIAGNOSIS — F31.9 BIPOLAR DISORDER, UNSPECIFIED: ICD-10-CM

## 2020-07-26 LAB
ALBUMIN SERPL ELPH-MCNC: 4.3 G/DL — SIGNIFICANT CHANGE UP (ref 3.3–5)
ALP SERPL-CCNC: 57 U/L — SIGNIFICANT CHANGE UP (ref 40–120)
ALT FLD-CCNC: 10 U/L — SIGNIFICANT CHANGE UP (ref 4–41)
ANION GAP SERPL CALC-SCNC: 10 MMO/L — SIGNIFICANT CHANGE UP (ref 7–14)
ANION GAP SERPL CALC-SCNC: 12 MMO/L — SIGNIFICANT CHANGE UP (ref 7–14)
AST SERPL-CCNC: 14 U/L — SIGNIFICANT CHANGE UP (ref 4–40)
BILIRUB SERPL-MCNC: 0.5 MG/DL — SIGNIFICANT CHANGE UP (ref 0.2–1.2)
BUN SERPL-MCNC: 13 MG/DL — SIGNIFICANT CHANGE UP (ref 7–23)
BUN SERPL-MCNC: 13 MG/DL — SIGNIFICANT CHANGE UP (ref 7–23)
CALCIUM SERPL-MCNC: 8.9 MG/DL — SIGNIFICANT CHANGE UP (ref 8.4–10.5)
CALCIUM SERPL-MCNC: 9.1 MG/DL — SIGNIFICANT CHANGE UP (ref 8.4–10.5)
CHLORIDE SERPL-SCNC: 97 MMOL/L — LOW (ref 98–107)
CHLORIDE SERPL-SCNC: 98 MMOL/L — SIGNIFICANT CHANGE UP (ref 98–107)
CO2 SERPL-SCNC: 26 MMOL/L — SIGNIFICANT CHANGE UP (ref 22–31)
CO2 SERPL-SCNC: 30 MMOL/L — SIGNIFICANT CHANGE UP (ref 22–31)
CREAT SERPL-MCNC: 1.07 MG/DL — SIGNIFICANT CHANGE UP (ref 0.5–1.3)
CREAT SERPL-MCNC: 1.08 MG/DL — SIGNIFICANT CHANGE UP (ref 0.5–1.3)
GLUCOSE SERPL-MCNC: 103 MG/DL — HIGH (ref 70–99)
GLUCOSE SERPL-MCNC: 103 MG/DL — HIGH (ref 70–99)
POTASSIUM SERPL-MCNC: 3 MMOL/L — LOW (ref 3.5–5.3)
POTASSIUM SERPL-MCNC: 3.5 MMOL/L — SIGNIFICANT CHANGE UP (ref 3.5–5.3)
POTASSIUM SERPL-SCNC: 3 MMOL/L — LOW (ref 3.5–5.3)
POTASSIUM SERPL-SCNC: 3.5 MMOL/L — SIGNIFICANT CHANGE UP (ref 3.5–5.3)
PROT SERPL-MCNC: 6.5 G/DL — SIGNIFICANT CHANGE UP (ref 6–8.3)
SARS-COV-2 IGG SERPL QL IA: NEGATIVE — SIGNIFICANT CHANGE UP
SARS-COV-2 IGM SERPL IA-ACNC: 0.09 INDEX — SIGNIFICANT CHANGE UP
SODIUM SERPL-SCNC: 136 MMOL/L — SIGNIFICANT CHANGE UP (ref 135–145)
SODIUM SERPL-SCNC: 137 MMOL/L — SIGNIFICANT CHANGE UP (ref 135–145)

## 2020-07-26 RX ORDER — SODIUM CHLORIDE 9 MG/ML
1000 INJECTION INTRAMUSCULAR; INTRAVENOUS; SUBCUTANEOUS ONCE
Refills: 0 | Status: COMPLETED | OUTPATIENT
Start: 2020-07-26 | End: 2020-07-26

## 2020-07-26 RX ORDER — POTASSIUM CHLORIDE 20 MEQ
40 PACKET (EA) ORAL ONCE
Refills: 0 | Status: COMPLETED | OUTPATIENT
Start: 2020-07-26 | End: 2020-07-26

## 2020-07-26 RX ORDER — POTASSIUM CHLORIDE 20 MEQ
10 PACKET (EA) ORAL
Refills: 0 | Status: COMPLETED | OUTPATIENT
Start: 2020-07-26 | End: 2020-07-26

## 2020-07-26 RX ADMIN — SODIUM CHLORIDE 1000 MILLILITER(S): 9 INJECTION INTRAMUSCULAR; INTRAVENOUS; SUBCUTANEOUS at 03:45

## 2020-07-26 RX ADMIN — Medication 100 MILLIEQUIVALENT(S): at 04:47

## 2020-07-26 RX ADMIN — Medication 1 MILLIGRAM(S): at 14:13

## 2020-07-26 RX ADMIN — Medication 10 MILLIEQUIVALENT(S): at 03:07

## 2020-07-26 RX ADMIN — Medication 6 MILLIGRAM(S): at 20:55

## 2020-07-26 RX ADMIN — QUETIAPINE FUMARATE 200 MILLIGRAM(S): 200 TABLET, FILM COATED ORAL at 20:55

## 2020-07-26 RX ADMIN — Medication 2 MILLIGRAM(S): at 08:05

## 2020-07-26 RX ADMIN — Medication 1 MILLIGRAM(S): at 21:00

## 2020-07-26 RX ADMIN — Medication 10 MILLIEQUIVALENT(S): at 04:11

## 2020-07-26 RX ADMIN — Medication 100 MILLIEQUIVALENT(S): at 03:24

## 2020-07-26 RX ADMIN — Medication 40 MILLIEQUIVALENT(S): at 02:07

## 2020-07-26 RX ADMIN — Medication 100 MILLIEQUIVALENT(S): at 02:07

## 2020-07-26 RX ADMIN — SODIUM CHLORIDE 1000 MILLILITER(S): 9 INJECTION INTRAMUSCULAR; INTRAVENOUS; SUBCUTANEOUS at 04:45

## 2020-07-26 NOTE — ED ADULT NURSE REASSESSMENT NOTE - NS ED NURSE REASSESS COMMENT FT1
Received rpt from SABRA Castanon. Pt. lying in bed sleeping with 1:1 supervision at bedside. Pt. stable and being picked up by EMS at this time to be transferred to [facility].

## 2020-07-26 NOTE — ED ADULT NURSE REASSESSMENT NOTE - COMFORT CARE
plan of care explained/side rails up/warm blanket provided/stretcher in lowest position, wheels locked/darkened lights

## 2020-07-26 NOTE — ED ADULT NURSE REASSESSMENT NOTE - NS ED NURSE REASSESS COMMENT FT1
Patient is aware he will be transferred to [facility] via EMS. IV discontinued as per protocol. Patient is A&Ox4. Respirations even and unlabored. Resting in stretcher. Denies any pain or discomfort. Will continue to monitor.

## 2020-07-27 LAB
CHOLEST SERPL-MCNC: 158 MG/DL — SIGNIFICANT CHANGE UP (ref 120–199)
HBA1C BLD-MCNC: 5.4 % — SIGNIFICANT CHANGE UP (ref 4–5.6)
HDLC SERPL-MCNC: 56 MG/DL — HIGH (ref 35–55)
LIPID PNL WITH DIRECT LDL SERPL: 91 MG/DL — SIGNIFICANT CHANGE UP
TRIGL SERPL-MCNC: 66 MG/DL — SIGNIFICANT CHANGE UP (ref 10–149)

## 2020-07-27 RX ORDER — QUETIAPINE FUMARATE 200 MG/1
250 TABLET, FILM COATED ORAL AT BEDTIME
Refills: 0 | Status: DISCONTINUED | OUTPATIENT
Start: 2020-07-27 | End: 2020-07-30

## 2020-07-27 RX ADMIN — QUETIAPINE FUMARATE 250 MILLIGRAM(S): 200 TABLET, FILM COATED ORAL at 21:18

## 2020-07-27 RX ADMIN — Medication 1 MILLIGRAM(S): at 09:07

## 2020-07-27 RX ADMIN — Medication 1 MILLIGRAM(S): at 16:35

## 2020-07-27 RX ADMIN — ONDANSETRON 4 MILLIGRAM(S): 8 TABLET, FILM COATED ORAL at 22:36

## 2020-07-27 RX ADMIN — Medication 1 MILLIGRAM(S): at 22:35

## 2020-07-27 RX ADMIN — Medication 6 MILLIGRAM(S): at 21:18

## 2020-07-28 RX ADMIN — Medication 6 MILLIGRAM(S): at 21:21

## 2020-07-28 RX ADMIN — Medication 1 MILLIGRAM(S): at 09:16

## 2020-07-28 RX ADMIN — Medication 1 MILLIGRAM(S): at 22:03

## 2020-07-28 RX ADMIN — QUETIAPINE FUMARATE 250 MILLIGRAM(S): 200 TABLET, FILM COATED ORAL at 21:21

## 2020-07-29 RX ORDER — QUETIAPINE FUMARATE 200 MG/1
1 TABLET, FILM COATED ORAL
Qty: 30 | Refills: 0
Start: 2020-07-29 | End: 2020-08-27

## 2020-07-29 RX ORDER — DOXAZOSIN MESYLATE 4 MG
3 TABLET ORAL
Qty: 90 | Refills: 0
Start: 2020-07-29 | End: 2020-08-27

## 2020-07-29 RX ADMIN — Medication 1 MILLIGRAM(S): at 17:29

## 2020-07-29 RX ADMIN — Medication 6 MILLIGRAM(S): at 21:52

## 2020-07-29 RX ADMIN — Medication 1 MILLIGRAM(S): at 09:09

## 2020-07-29 RX ADMIN — QUETIAPINE FUMARATE 250 MILLIGRAM(S): 200 TABLET, FILM COATED ORAL at 21:52

## 2020-07-30 VITALS — DIASTOLIC BLOOD PRESSURE: 58 MMHG | TEMPERATURE: 96 F | SYSTOLIC BLOOD PRESSURE: 100 MMHG

## 2020-09-24 ENCOUNTER — EMERGENCY (EMERGENCY)
Facility: HOSPITAL | Age: 20
LOS: 1 days | Discharge: HOME | End: 2020-09-24
Attending: PERSONAL EMERGENCY RESPONSE ATTENDANT
Payer: COMMERCIAL

## 2020-09-24 VITALS
RESPIRATION RATE: 18 BRPM | DIASTOLIC BLOOD PRESSURE: 75 MMHG | OXYGEN SATURATION: 98 % | HEART RATE: 78 BPM | SYSTOLIC BLOOD PRESSURE: 128 MMHG

## 2020-09-24 VITALS
OXYGEN SATURATION: 97 % | RESPIRATION RATE: 20 BRPM | SYSTOLIC BLOOD PRESSURE: 110 MMHG | HEIGHT: 68 IN | WEIGHT: 164.91 LBS | DIASTOLIC BLOOD PRESSURE: 77 MMHG | TEMPERATURE: 98 F | HEART RATE: 126 BPM

## 2020-09-24 PROBLEM — K51.90 ULCERATIVE COLITIS, UNSPECIFIED, WITHOUT COMPLICATIONS: Chronic | Status: ACTIVE | Noted: 2020-07-25

## 2020-09-24 LAB
ALBUMIN SERPL ELPH-MCNC: 5.5 G/DL — HIGH (ref 3.3–5)
ALP SERPL-CCNC: 70 U/L — SIGNIFICANT CHANGE UP (ref 40–120)
ALT FLD-CCNC: 15 U/L — SIGNIFICANT CHANGE UP (ref 10–45)
ANION GAP SERPL CALC-SCNC: 12 MMOL/L — SIGNIFICANT CHANGE UP (ref 5–17)
ANION GAP SERPL CALC-SCNC: 17 MMOL/L — SIGNIFICANT CHANGE UP (ref 5–17)
AST SERPL-CCNC: 17 U/L — SIGNIFICANT CHANGE UP (ref 10–40)
BASOPHILS # BLD AUTO: 0.03 K/UL — SIGNIFICANT CHANGE UP (ref 0–0.2)
BASOPHILS NFR BLD AUTO: 0.3 % — SIGNIFICANT CHANGE UP (ref 0–2)
BILIRUB SERPL-MCNC: 1 MG/DL — SIGNIFICANT CHANGE UP (ref 0.2–1.2)
BUN SERPL-MCNC: 24 MG/DL — HIGH (ref 7–23)
BUN SERPL-MCNC: 29 MG/DL — HIGH (ref 7–23)
CALCIUM SERPL-MCNC: 10.4 MG/DL — SIGNIFICANT CHANGE UP (ref 8.4–10.5)
CALCIUM SERPL-MCNC: 9.2 MG/DL — SIGNIFICANT CHANGE UP (ref 8.4–10.5)
CHLORIDE SERPL-SCNC: 89 MMOL/L — LOW (ref 96–108)
CHLORIDE SERPL-SCNC: 96 MMOL/L — SIGNIFICANT CHANGE UP (ref 96–108)
CO2 SERPL-SCNC: 30 MMOL/L — SIGNIFICANT CHANGE UP (ref 22–31)
CO2 SERPL-SCNC: 30 MMOL/L — SIGNIFICANT CHANGE UP (ref 22–31)
CREAT SERPL-MCNC: 1.09 MG/DL — SIGNIFICANT CHANGE UP (ref 0.5–1.3)
CREAT SERPL-MCNC: 1.2 MG/DL — SIGNIFICANT CHANGE UP (ref 0.5–1.3)
EOSINOPHIL # BLD AUTO: 0.02 K/UL — SIGNIFICANT CHANGE UP (ref 0–0.5)
EOSINOPHIL NFR BLD AUTO: 0.2 % — SIGNIFICANT CHANGE UP (ref 0–6)
GLUCOSE SERPL-MCNC: 109 MG/DL — HIGH (ref 70–99)
GLUCOSE SERPL-MCNC: 110 MG/DL — HIGH (ref 70–99)
HCT VFR BLD CALC: 46.8 % — SIGNIFICANT CHANGE UP (ref 39–50)
HGB BLD-MCNC: 16.6 G/DL — SIGNIFICANT CHANGE UP (ref 13–17)
IMM GRANULOCYTES NFR BLD AUTO: 0.5 % — SIGNIFICANT CHANGE UP (ref 0–1.5)
LIDOCAIN IGE QN: 20 U/L — SIGNIFICANT CHANGE UP (ref 7–60)
LYMPHOCYTES # BLD AUTO: 1.53 K/UL — SIGNIFICANT CHANGE UP (ref 1–3.3)
LYMPHOCYTES # BLD AUTO: 17.5 % — SIGNIFICANT CHANGE UP (ref 13–44)
MAGNESIUM SERPL-MCNC: 2.6 MG/DL — SIGNIFICANT CHANGE UP (ref 1.6–2.6)
MCHC RBC-ENTMCNC: 30.9 PG — SIGNIFICANT CHANGE UP (ref 27–34)
MCHC RBC-ENTMCNC: 35.5 GM/DL — SIGNIFICANT CHANGE UP (ref 32–36)
MCV RBC AUTO: 87 FL — SIGNIFICANT CHANGE UP (ref 80–100)
MONOCYTES # BLD AUTO: 1.02 K/UL — HIGH (ref 0–0.9)
MONOCYTES NFR BLD AUTO: 11.7 % — SIGNIFICANT CHANGE UP (ref 2–14)
NEUTROPHILS # BLD AUTO: 6.08 K/UL — SIGNIFICANT CHANGE UP (ref 1.8–7.4)
NEUTROPHILS NFR BLD AUTO: 69.8 % — SIGNIFICANT CHANGE UP (ref 43–77)
NRBC # BLD: 0 /100 WBCS — SIGNIFICANT CHANGE UP (ref 0–0)
PHOSPHATE SERPL-MCNC: 4 MG/DL — SIGNIFICANT CHANGE UP (ref 2.5–4.5)
PLATELET # BLD AUTO: 375 K/UL — SIGNIFICANT CHANGE UP (ref 150–400)
POTASSIUM SERPL-MCNC: 3 MMOL/L — LOW (ref 3.5–5.3)
POTASSIUM SERPL-MCNC: 3.3 MMOL/L — LOW (ref 3.5–5.3)
POTASSIUM SERPL-SCNC: 3 MMOL/L — LOW (ref 3.5–5.3)
POTASSIUM SERPL-SCNC: 3.3 MMOL/L — LOW (ref 3.5–5.3)
PROT SERPL-MCNC: 8.7 G/DL — HIGH (ref 6–8.3)
RBC # BLD: 5.38 M/UL — SIGNIFICANT CHANGE UP (ref 4.2–5.8)
RBC # FLD: 12.7 % — SIGNIFICANT CHANGE UP (ref 10.3–14.5)
SODIUM SERPL-SCNC: 136 MMOL/L — SIGNIFICANT CHANGE UP (ref 135–145)
SODIUM SERPL-SCNC: 138 MMOL/L — SIGNIFICANT CHANGE UP (ref 135–145)
WBC # BLD: 8.72 K/UL — SIGNIFICANT CHANGE UP (ref 3.8–10.5)
WBC # FLD AUTO: 8.72 K/UL — SIGNIFICANT CHANGE UP (ref 3.8–10.5)

## 2020-09-24 PROCEDURE — 80053 COMPREHEN METABOLIC PANEL: CPT

## 2020-09-24 PROCEDURE — 84100 ASSAY OF PHOSPHORUS: CPT

## 2020-09-24 PROCEDURE — 83690 ASSAY OF LIPASE: CPT

## 2020-09-24 PROCEDURE — 83735 ASSAY OF MAGNESIUM: CPT

## 2020-09-24 PROCEDURE — 96375 TX/PRO/DX INJ NEW DRUG ADDON: CPT

## 2020-09-24 PROCEDURE — 99285 EMERGENCY DEPT VISIT HI MDM: CPT

## 2020-09-24 PROCEDURE — 96374 THER/PROPH/DIAG INJ IV PUSH: CPT

## 2020-09-24 PROCEDURE — 99284 EMERGENCY DEPT VISIT MOD MDM: CPT | Mod: 25

## 2020-09-24 PROCEDURE — 85025 COMPLETE CBC W/AUTO DIFF WBC: CPT

## 2020-09-24 PROCEDURE — 93010 ELECTROCARDIOGRAM REPORT: CPT | Mod: 76,59

## 2020-09-24 PROCEDURE — 80048 BASIC METABOLIC PNL TOTAL CA: CPT

## 2020-09-24 PROCEDURE — 93005 ELECTROCARDIOGRAM TRACING: CPT

## 2020-09-24 RX ORDER — ONDANSETRON 8 MG/1
1 TABLET, FILM COATED ORAL
Qty: 6 | Refills: 0
Start: 2020-09-24 | End: 2020-09-25

## 2020-09-24 RX ORDER — ONDANSETRON 8 MG/1
4 TABLET, FILM COATED ORAL ONCE
Refills: 0 | Status: COMPLETED | OUTPATIENT
Start: 2020-09-24 | End: 2020-09-24

## 2020-09-24 RX ORDER — MAGNESIUM SULFATE 500 MG/ML
1 VIAL (ML) INJECTION ONCE
Refills: 0 | Status: COMPLETED | OUTPATIENT
Start: 2020-09-24 | End: 2020-09-24

## 2020-09-24 RX ORDER — FAMOTIDINE 10 MG/ML
20 INJECTION INTRAVENOUS ONCE
Refills: 0 | Status: COMPLETED | OUTPATIENT
Start: 2020-09-24 | End: 2020-09-24

## 2020-09-24 RX ORDER — POTASSIUM CHLORIDE 20 MEQ
10 PACKET (EA) ORAL ONCE
Refills: 0 | Status: COMPLETED | OUTPATIENT
Start: 2020-09-24 | End: 2020-09-24

## 2020-09-24 RX ORDER — POTASSIUM CHLORIDE 20 MEQ
40 PACKET (EA) ORAL ONCE
Refills: 0 | Status: COMPLETED | OUTPATIENT
Start: 2020-09-24 | End: 2020-09-24

## 2020-09-24 RX ORDER — SODIUM CHLORIDE 9 MG/ML
1000 INJECTION INTRAMUSCULAR; INTRAVENOUS; SUBCUTANEOUS ONCE
Refills: 0 | Status: COMPLETED | OUTPATIENT
Start: 2020-09-24 | End: 2020-09-24

## 2020-09-24 RX ADMIN — ONDANSETRON 4 MILLIGRAM(S): 8 TABLET, FILM COATED ORAL at 15:47

## 2020-09-24 RX ADMIN — Medication 30 MILLILITER(S): at 18:00

## 2020-09-24 RX ADMIN — FAMOTIDINE 20 MILLIGRAM(S): 10 INJECTION INTRAVENOUS at 17:59

## 2020-09-24 RX ADMIN — Medication 100 MILLIEQUIVALENT(S): at 17:59

## 2020-09-24 RX ADMIN — Medication 40 MILLIEQUIVALENT(S): at 17:59

## 2020-09-24 RX ADMIN — SODIUM CHLORIDE 1000 MILLILITER(S): 9 INJECTION INTRAMUSCULAR; INTRAVENOUS; SUBCUTANEOUS at 17:31

## 2020-09-24 RX ADMIN — Medication 100 GRAM(S): at 19:17

## 2020-09-24 NOTE — ED PROVIDER NOTE - PHYSICAL EXAMINATION
I have reviewed the triage vital signs.  Const: AAOx3, in NAD  Eyes: no conjunctival injection  HENT: NCAT, Neck supple, oral mucosa moist  CV: RRR, +S1, S2  Resp: CTAB, no respiratory distress  GI: Abdomen soft, +mild epigastric TTP, no guarding, no CVA tenderness  Extremities: No peripheral edema,  2+ radial and DP pulses  Skin: Warm, well perfused, no rash  MSK: No gross deformities appreciated  Neuro: No focal sensory or motor deficits  Psych: Appropriate mood and affect

## 2020-09-24 NOTE — ED PROVIDER NOTE - NS ED ROS FT
General: Denies fevers or chills  Eyes: Denies vision changes  ENMT: Denies trouble swallowing or speaking, or sore throat  CV: Denies chest pain or palpitations  Resp: Denies cough or SOB  GI: +Mild epigastric discomfort, nausea, vomiting.  No diarrhea, or constipation   : Denies painful urination, increased urinary frequency, or blood in urine  Skin: Denies new rashes  Neuro: Denies headache, numbness, or weakness  MSK: Denies recent trauma

## 2020-09-24 NOTE — ED PROVIDER NOTE - PROGRESS NOTE DETAILS
Adrian, PGY2 - Pt states he feels much better s/p Zofran. No nausea currently. Will replete K and reassess. Ford, PGY2 - received s/o on this pt. rpt K 3.4 after repletion. rpt ekg showing improved U waves. pt feeling improved, tolerating PO, ambulating in the ED.

## 2020-09-24 NOTE — ED PROVIDER NOTE - PATIENT PORTAL LINK FT
You can access the FollowMyHealth Patient Portal offered by [facility] by registering at the following website: [email or web address]. By joining [facility]’s FollowMyHealth portal, you will also be able to view your health information using other applications (apps) compatible with our system.

## 2020-09-24 NOTE — ED PROVIDER NOTE - NSFOLLOWUPCLINICS_GEN_ALL_ED_FT
[facility] - Primary Care  Primary Care  [address] [place] [address]  Phone: [phone]  Fax:   Follow Up Time:

## 2020-09-24 NOTE — ED PROVIDER NOTE - RAPID ASSESSMENT
attending Curtis: 20yM on Seroquel, Latuda and ativan p/w persistent nausea/vomiting not tolerating PO x 4 days. No relief with famotidine. No diarrhea., fever/chills, hematemesis. +mid/upper abdominal pain. Denies regular or recent alcohol use. +marijuana use but denies use since Monday. Have prescription for zofran but hasn't filled it. No prior abdominal surgeries.

## 2020-09-24 NOTE — ED PROVIDER NOTE - NSFOLLOWUPINSTRUCTIONS_ED_ALL_ED_FT
Take zofran 4mg by mouth every 8 hours as needed for nausea and vomiting.     Rest and stay well hydrated.     Follow-up with your PMD in 1 week for recheck.     Return to the ED for any worsening vomiting, fevers, chills or any new concerning symptoms.

## 2020-09-24 NOTE — ED PROVIDER NOTE - CLINICAL SUMMARY MEDICAL DECISION MAKING FREE TEXT BOX
Adrian, PGY2 - 20M PMH bipolar, gastritis, marijuana user p/w N/V x 3 days. Mild epigastric TTP. Feeling much better upon my evaluation s/p Zofran. Will replete ZHENG GODINEZ, reassess Adrian, PGY2 - 20M PMH bipolar, gastritis, marijuana user p/w N/V x 3 days. Mild epigastric TTP. Feeling much better upon my evaluation s/p Zofran. Suspect cannabis hyperemesis, gastritis. Low suspicion for pancreatitis or esophageal rupture. Will replete K, give GI cocktail, PO chall, reassess

## 2020-09-24 NOTE — ED PROVIDER NOTE - ATTENDING CONTRIBUTION TO CARE
Attending MD Alejandra.  Agree with above.  Pt is a 19 yo male with pmhx of on seroquel for bipolar and has IBS.  Pt presents to ED today with complaint of inabiltiy to tolerate PO x 3 days, n/v.  No infectious sxs/diarrhea/constipation/blood in stool.   Pt feels better after zofran from earlier.  Plan to replete potassium IV and PO.  Pt endorses sxs improve at home only with hot showers.  Has had multiple similar episodes previously.  Endorses cannabis use usually 3x/wk.  Abdomen soft, vague epigastric TTP.  Neg hernández's sign.     Pt with U waves on EKG.  Planned K+ repletion.  Pt extensively educated re: cannabis hyperemesis and need to discontinue all marijuana use. Attending MD Alejandra.  Agree with above.  Pt is a 21 yo male with pmhx of on seroquel for bipolar and has IBS.  Pt presents to ED today with complaint of inabiltiy to tolerate PO x 3 days, n/v.  No infectious sxs/diarrhea/constipation/blood in stool.   Pt feels better after zofran from earlier.  Plan to replete potassium IV and PO.  Pt endorses sxs improve at home only with hot showers.  Has had multiple similar episodes previously.  Endorses cannabis use usually 3x/wk.  Abdomen soft, vague epigastric TTP.  Neg hernández's sign.     Pt with U waves on EKG.  Planned K+ repletion.  Pt extensively educated re: cannabis hyperemesis and need to discontinue all marijuana use.    Sxs improved, EKG and labs improved.  PT tolerating PO.  Abdomen remains benign.  Stable for discharge home.  Follow-up with PCP for ongoing management.

## 2020-09-24 NOTE — ED ADULT NURSE NOTE - OBJECTIVE STATEMENT
19 y/o male coming in from home complaining of vomiting. AOx4, ambulatory, PMH IBS, bipolar, anxiety on seroquil and ativan. Pt. states he has flare ups of his IBS and began to feel nauseous a few days ago. Reports vomiting for the last few days and unable to keep down food and medications with associated weight loss. Tender to palpation in mid upper abdomen. Abdomen soft, non-distended. Pt. denies any fevers, cough, chest pain, SOB. Denies any other complaints. Pt. placed in hospital NIRMALA olivier. Will continue to reassess.

## 2020-09-24 NOTE — ED PROVIDER NOTE - OBJECTIVE STATEMENT
20M PMH Bipolar disorder, IBS p/w NBNB N/V x 3 days. Pt states he has been unable to tolerate PO, including some of his meds. A/w mild epigastric discomfort. No f/c, CP, SOB, URI sx, diarrhea, constipation, bloody stool, urinary sx. States he doesn't smoke much and has been cutting back on marijuana this week due to feeling unwell. Denies daily ETOH use, states he hasn't been drinking much since graduating in May. 20M PMH Bipolar disorder, marijuana user p/w NBNB N/V x 3 days. Pt states he has been unable to tolerate PO, including some of his meds. A/w mild epigastric discomfort. No f/c, CP, SOB, URI sx, diarrhea, constipation, bloody stool, urinary sx. States he doesn't smoke much and has been cutting back on marijuana this week due to feeling unwell. Denies daily ETOH use, states he hasn't been drinking much since graduating in May. 20M PMH Bipolar disorder, IBS, marijuana user p/w NBNB N/V x 3 days. Pt states he has been unable to tolerate PO, including some of his meds. A/w mild epigastric discomfort. No f/c, CP, SOB, URI sx, diarrhea, constipation, bloody stool, urinary sx. States he doesn't smoke much and has been cutting back on marijuana this week due to feeling unwell. Denies daily ETOH use, states he hasn't been drinking much since graduating in May.

## 2020-09-24 NOTE — ED ADULT NURSE REASSESSMENT NOTE - NS ED NURSE REASSESS COMMENT FT1
Report received from Ema MONTAÑO. Pt calm and cooperative, Pt states he feels better, nausea improved. Denies chest pain, sob, ha, n/v/d, abdominal pain, f/c, urinary symptoms, hematuria

## 2020-09-24 NOTE — ED ADULT NURSE NOTE - HOW MANY DRINKS CONTAINING ALCOHOL DO YOU HAVE ON A TYPICAL DAY WHEN YOU ARE DRINKING?
DATE:  01/18/2019



CARDIOLOGY FOLLOWUP



SUBJECTIVE:  The patient is ambulating without symptoms.  No chest pain

noted.



PHYSICAL EXAMINATION:

VITAL SIGNS:  Blood pressure 130/84, heart rate in the 80s.

NECK:  Negative JVD.

LUNGS:  Without rales.

HEART:  S1, S2.

EXTREMITIES:  Without edema.



LABORATORY DATA:  Hemoglobin is 15.5, BUN and creatinine unremarkable. 

Glucose is 121.



IMPRESSION:

1.  Stable post percutaneous transluminal coronary angioplasty and stent of

a proximal left anterior descending.

2.  Multivessel coronary artery disease.

3.  History of coronary bypass surgery.

4.  Diabetes mellitus.

5.  Hypercholesterolemia.

6.  Hypertension.

7.  Increased weight.



Given these findings, the patient is stable for discharge.  Followup and

instructions have been given to the patient.  He will follow up with me in

the office prior to going back to work.  His medications have been

reviewed.  I have discussed with the patient about the change in his diet.







__________________________________________

Leland Lopez MD





DD:  01/18/2019 9:13:15

DT:  01/18/2019 9:15:57

Job # [id number] 1 or 2

## 2021-06-14 ENCOUNTER — INPATIENT (INPATIENT)
Facility: HOSPITAL | Age: 21
LOS: 1 days | Discharge: HOME | End: 2021-06-16
Attending: PSYCHIATRY & NEUROLOGY | Admitting: PSYCHIATRY & NEUROLOGY
Payer: COMMERCIAL

## 2021-06-14 VITALS
DIASTOLIC BLOOD PRESSURE: 60 MMHG | HEIGHT: 68 IN | SYSTOLIC BLOOD PRESSURE: 132 MMHG | HEART RATE: 109 BPM | OXYGEN SATURATION: 100 % | TEMPERATURE: 98 F | RESPIRATION RATE: 18 BRPM

## 2021-06-14 DIAGNOSIS — F31.63 BIPOLAR DISORDER, CURRENT EPISODE MIXED, SEVERE, WITHOUT PSYCHOTIC FEATURES: ICD-10-CM

## 2021-06-14 LAB
ALBUMIN SERPL ELPH-MCNC: 5.1 G/DL — HIGH (ref 3.3–5)
ALP SERPL-CCNC: 68 U/L — SIGNIFICANT CHANGE UP (ref 40–120)
ALT FLD-CCNC: 13 U/L — SIGNIFICANT CHANGE UP (ref 4–41)
AMPHET UR-MCNC: NEGATIVE — SIGNIFICANT CHANGE UP
ANION GAP SERPL CALC-SCNC: 14 MMOL/L — SIGNIFICANT CHANGE UP (ref 7–14)
APAP SERPL-MCNC: <15 UG/ML — SIGNIFICANT CHANGE UP (ref 15–25)
APPEARANCE UR: CLEAR — SIGNIFICANT CHANGE UP
AST SERPL-CCNC: 16 U/L — SIGNIFICANT CHANGE UP (ref 4–40)
BACTERIA # UR AUTO: NEGATIVE — SIGNIFICANT CHANGE UP
BARBITURATES UR SCN-MCNC: NEGATIVE — SIGNIFICANT CHANGE UP
BASOPHILS # BLD AUTO: 0.03 K/UL — SIGNIFICANT CHANGE UP (ref 0–0.2)
BASOPHILS NFR BLD AUTO: 0.3 % — SIGNIFICANT CHANGE UP (ref 0–2)
BENZODIAZ UR-MCNC: POSITIVE
BILIRUB SERPL-MCNC: 0.5 MG/DL — SIGNIFICANT CHANGE UP (ref 0.2–1.2)
BILIRUB UR-MCNC: NEGATIVE — SIGNIFICANT CHANGE UP
BUN SERPL-MCNC: 17 MG/DL — SIGNIFICANT CHANGE UP (ref 7–23)
CALCIUM SERPL-MCNC: 9.7 MG/DL — SIGNIFICANT CHANGE UP (ref 8.4–10.5)
CHLORIDE SERPL-SCNC: 102 MMOL/L — SIGNIFICANT CHANGE UP (ref 98–107)
CO2 SERPL-SCNC: 25 MMOL/L — SIGNIFICANT CHANGE UP (ref 22–31)
COCAINE METAB.OTHER UR-MCNC: NEGATIVE — SIGNIFICANT CHANGE UP
COLOR SPEC: YELLOW — SIGNIFICANT CHANGE UP
CREAT SERPL-MCNC: 1.12 MG/DL — SIGNIFICANT CHANGE UP (ref 0.5–1.3)
CREATININE URINE RESULT, DAU: 323 MG/DL — SIGNIFICANT CHANGE UP
DIFF PNL FLD: NEGATIVE — SIGNIFICANT CHANGE UP
EOSINOPHIL # BLD AUTO: 0.02 K/UL — SIGNIFICANT CHANGE UP (ref 0–0.5)
EOSINOPHIL NFR BLD AUTO: 0.2 % — SIGNIFICANT CHANGE UP (ref 0–6)
EPI CELLS # UR: 1 /HPF — SIGNIFICANT CHANGE UP (ref 0–5)
ETHANOL SERPL-MCNC: <10 MG/DL — SIGNIFICANT CHANGE UP
GLUCOSE SERPL-MCNC: 91 MG/DL — SIGNIFICANT CHANGE UP (ref 70–99)
GLUCOSE UR QL: NEGATIVE — SIGNIFICANT CHANGE UP
HCT VFR BLD CALC: 43.4 % — SIGNIFICANT CHANGE UP (ref 39–50)
HGB BLD-MCNC: 14.2 G/DL — SIGNIFICANT CHANGE UP (ref 13–17)
HYALINE CASTS # UR AUTO: 2 /LPF — SIGNIFICANT CHANGE UP (ref 0–7)
IANC: 7.4 K/UL — SIGNIFICANT CHANGE UP (ref 1.5–8.5)
IMM GRANULOCYTES NFR BLD AUTO: 0.6 % — SIGNIFICANT CHANGE UP (ref 0–1.5)
KETONES UR-MCNC: ABNORMAL
LEUKOCYTE ESTERASE UR-ACNC: NEGATIVE — SIGNIFICANT CHANGE UP
LYMPHOCYTES # BLD AUTO: 1.72 K/UL — SIGNIFICANT CHANGE UP (ref 1–3.3)
LYMPHOCYTES # BLD AUTO: 17.2 % — SIGNIFICANT CHANGE UP (ref 13–44)
MCHC RBC-ENTMCNC: 29.8 PG — SIGNIFICANT CHANGE UP (ref 27–34)
MCHC RBC-ENTMCNC: 32.7 GM/DL — SIGNIFICANT CHANGE UP (ref 32–36)
MCV RBC AUTO: 91.2 FL — SIGNIFICANT CHANGE UP (ref 80–100)
METHADONE UR-MCNC: NEGATIVE — SIGNIFICANT CHANGE UP
MONOCYTES # BLD AUTO: 0.76 K/UL — SIGNIFICANT CHANGE UP (ref 0–0.9)
MONOCYTES NFR BLD AUTO: 7.6 % — SIGNIFICANT CHANGE UP (ref 2–14)
NEUTROPHILS # BLD AUTO: 7.4 K/UL — SIGNIFICANT CHANGE UP (ref 1.8–7.4)
NEUTROPHILS NFR BLD AUTO: 74.1 % — SIGNIFICANT CHANGE UP (ref 43–77)
NITRITE UR-MCNC: NEGATIVE — SIGNIFICANT CHANGE UP
NRBC # BLD: 0 /100 WBCS — SIGNIFICANT CHANGE UP
NRBC # FLD: 0 K/UL — SIGNIFICANT CHANGE UP
OPIATES UR-MCNC: NEGATIVE — SIGNIFICANT CHANGE UP
OXYCODONE UR-MCNC: NEGATIVE — SIGNIFICANT CHANGE UP
PCP SPEC-MCNC: SIGNIFICANT CHANGE UP
PCP UR-MCNC: NEGATIVE — SIGNIFICANT CHANGE UP
PH UR: 6 — SIGNIFICANT CHANGE UP (ref 5–8)
PLATELET # BLD AUTO: 313 K/UL — SIGNIFICANT CHANGE UP (ref 150–400)
POTASSIUM SERPL-MCNC: 4 MMOL/L — SIGNIFICANT CHANGE UP (ref 3.5–5.3)
POTASSIUM SERPL-SCNC: 4 MMOL/L — SIGNIFICANT CHANGE UP (ref 3.5–5.3)
PROT SERPL-MCNC: 7.3 G/DL — SIGNIFICANT CHANGE UP (ref 6–8.3)
PROT UR-MCNC: ABNORMAL
RBC # BLD: 4.76 M/UL — SIGNIFICANT CHANGE UP (ref 4.2–5.8)
RBC # FLD: 14.7 % — HIGH (ref 10.3–14.5)
RBC CASTS # UR COMP ASSIST: 1 /HPF — SIGNIFICANT CHANGE UP (ref 0–4)
SALICYLATES SERPL-MCNC: <5 MG/DL — LOW (ref 15–30)
SODIUM SERPL-SCNC: 141 MMOL/L — SIGNIFICANT CHANGE UP (ref 135–145)
SP GR SPEC: 1.03 — HIGH (ref 1.01–1.02)
THC UR QL: POSITIVE
TOXICOLOGY SCREEN, DRUGS OF ABUSE, SERUM RESULT: SIGNIFICANT CHANGE UP
TSH SERPL-MCNC: 1.74 UIU/ML — SIGNIFICANT CHANGE UP (ref 0.27–4.2)
UROBILINOGEN FLD QL: SIGNIFICANT CHANGE UP
WBC # BLD: 9.99 K/UL — SIGNIFICANT CHANGE UP (ref 3.8–10.5)
WBC # FLD AUTO: 9.99 K/UL — SIGNIFICANT CHANGE UP (ref 3.8–10.5)
WBC UR QL: 1 /HPF — SIGNIFICANT CHANGE UP (ref 0–5)

## 2021-06-14 PROCEDURE — 99285 EMERGENCY DEPT VISIT HI MDM: CPT

## 2021-06-14 PROCEDURE — 99285 EMERGENCY DEPT VISIT HI MDM: CPT | Mod: 25

## 2021-06-14 PROCEDURE — 93010 ELECTROCARDIOGRAM REPORT: CPT | Mod: 59

## 2021-06-14 RX ORDER — OLANZAPINE 15 MG/1
5 TABLET, FILM COATED ORAL EVERY 6 HOURS
Refills: 0 | Status: DISCONTINUED | OUTPATIENT
Start: 2021-06-15 | End: 2021-06-16

## 2021-06-14 RX ORDER — QUETIAPINE FUMARATE 200 MG/1
200 TABLET, FILM COATED ORAL AT BEDTIME
Refills: 0 | Status: DISCONTINUED | OUTPATIENT
Start: 2021-06-15 | End: 2021-06-16

## 2021-06-14 RX ORDER — FAMOTIDINE 10 MG/ML
20 INJECTION INTRAVENOUS DAILY
Refills: 0 | Status: DISCONTINUED | OUTPATIENT
Start: 2021-06-15 | End: 2021-06-16

## 2021-06-14 RX ORDER — CLONAZEPAM 1 MG
2 TABLET ORAL THREE TIMES A DAY
Refills: 0 | Status: DISCONTINUED | OUTPATIENT
Start: 2021-06-15 | End: 2021-06-16

## 2021-06-14 RX ORDER — OLANZAPINE 15 MG/1
10 TABLET, FILM COATED ORAL EVERY 8 HOURS
Refills: 0 | Status: DISCONTINUED | OUTPATIENT
Start: 2021-06-15 | End: 2021-06-16

## 2021-06-14 RX ORDER — ONDANSETRON 8 MG/1
4 TABLET, FILM COATED ORAL EVERY 8 HOURS
Refills: 0 | Status: DISCONTINUED | OUTPATIENT
Start: 2021-06-15 | End: 2021-06-16

## 2021-06-14 NOTE — ED BEHAVIORAL HEALTH ASSESSMENT NOTE - RISK ASSESSMENT
Risk factors for harm to self and others include bipolar, depression (possible mixed episode), recent suicidal ideation, poor impulse control, erratic/dangerous behavior, substance abuse, poor insight. Protective factors include that patient has good family supports and is engaged in treatment. Currently in treatment. High Acute Suicide Risk

## 2021-06-14 NOTE — ED PROVIDER NOTE - CARE PLAN
Principal Discharge DX:	Bipolar disorder, current episode mixed, severe, without psychotic features

## 2021-06-14 NOTE — ED ADULT NURSE NOTE - NS ED NURSE REPORT GIVEN TO FT
I call Pt in regards to Rx refill on Lasix. Informed Pt that Rx was refilled and sent to the pharmacy. Pt verbalized understanding. Camilla Quintanilla

## 2021-06-14 NOTE — ED ADULT NURSE NOTE - OBJECTIVE STATEMENT
Pt arrived to  reporting verbal altercation with family. Pt reports that family thinks that he is having a manic episode. Pt reports that he made a statement along the lines of not wanting to live anymore as a result of the argument. Pt currently denies S/I H/I A/H V/H. Pt changed into  clothing. Personal property collected and logged.

## 2021-06-14 NOTE — ED ADULT TRIAGE NOTE - CHIEF COMPLAINT QUOTE
Pt from home. HX BiPolar. Pt compliant with medication, As per family pt eluded to suicidal ideation.

## 2021-06-14 NOTE — ED BEHAVIORAL HEALTH ASSESSMENT NOTE - SUMMARY
Patient is a 21 y/o M, single, domiciled with parents and siblings, graduated college May 2021, PMH of chronic nausea, PPHx of Bipolar d/o, 3 prior inpt psychiatric hospitalizations, currently in treatment with Dr. Yepez, as per guanacor patient has hx of SA, h/o Cannabis use,  no known hx of violence or aggression, presents [place] ED BIB [facility] after parents called 911 when patient was threatening to kill himself.   Patient currently denies recent depressed mood or anhedonia. He is minimizing recent events and appears to blame his family for his recent behavior. Patient was irritable but cooperative during interview. He appears depressed and does not appear manic or psychotic. Patient denies any recent ETOH or benzo abuse and does not appear intoxicated with to be in withdrawal. Patient does not appear forthcoming and has poor insight to his behavior. Patient is not in agreement to hospitalization and will be admitted on a voluntary status. Father is in agreement to hospitalization.   Recommend Patient is a 21 y/o M, single, domiciled with parents and siblings, graduated college May 2021, PMH of chronic nausea and GERD, PPHx of Bipolar d/o, 3 prior inpt psychiatric hospitalizations, currently in treatment with Dr. Yepez, as per annette patient has hx of SA, h/o Cannabis use,  no known hx of violence or aggression, presents to ED BIB [facility] after parents called 911 when patient was threatening to kill himself.   Patient currently denies recent depressed mood or anhedonia. He is minimizing recent events and appears to blame his family for his recent behavior. Patient was irritable but cooperative during interview. He appears depressed and does not appear manic or psychotic. Patient denies any recent ETOH or benzo abuse and does not appear intoxicated with to be in withdrawal. Patient does not appear forthcoming and has poor insight to his behavior. Patient is not in agreement to hospitalization and will be admitted on a involuntary status. Father is in agreement to hospitalization.   Recommend  Admit to [facility]  Decrease Wellbutrin XL to 150 mg daily- this may be activation patient  Vraylar nonformulary sent to pharmacy  Continue Seroquel 200 mg hs  Decrease Klonopin to 2mg tid- patient reports he usually takes tid  Continue Famantidine 20 mg daily for GERD  Zofran 4 mg q8h prn

## 2021-06-14 NOTE — ED PROVIDER NOTE - OBJECTIVE STATEMENT
This is a 20 yr old M, pmh bipolar disorder, gastritis, ulcerative colitis with c/o depression, anxiety, agitation and passive si. Pt was sent in from home after a verbal altercation and disagreement with family.   Pt gave father # but that was a non working number. Previous chart checks  mother ( [phone])   Dank Mueller psychiatrist [phone]  Marco A Ruby therapist ( [phone]) This is a 20 yr old M, pmh bipolar disorder, gastritis, ulcerative colitis with c/o depression, anxiety, agitation and passive si. Pt was sent in from home after a verbal altercation and disagreement with family. Pt states yesterday they had a family disagreement and in the morning that continued. He does not know why he is here, he never expressed si. " I am tired of this. " He admits previous psych hospitalization last year July, reports compliance with medication regiment.   Pt gave father # but that was a non working number. Previous chart checks  mother ( [phone]). Provider called mother, who is expressing safety concerns. They had an argument last night and carried away in the morning. He told his brother his " goodbye" and his brother became concern about his safety. Mother reports he takes the family car without them knowing it and they do not know what his intends are.   Dank Mueller psychiatrist [phone]  Marco A Ruby therapist ( [phone])

## 2021-06-14 NOTE — ED BEHAVIORAL HEALTH ASSESSMENT NOTE - CASE SUMMARY
20M with bipolar and personality disorder presents with EMS activated by family for suicidal threats. Patient has been increasingly irritable with his family, threatening to harm himself if his needs are not immediately met. Compliant with meds, Prescribed high dose klonopin for unclear reasons (?possible diversion). Patient minimizing, family is concerned for safety, unable to safely discharge pt at this point, need further observation to assess mood symptoms and suicidality. Admit 939. in good behavioral control in the ED, no PRN needed, would not recommend 1:1 at this time.

## 2021-06-14 NOTE — ED BEHAVIORAL HEALTH ASSESSMENT NOTE - DESCRIPTION
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

## 2021-06-14 NOTE — ED BEHAVIORAL HEALTH ASSESSMENT NOTE - CURRENT MEDICATION
Seroquel, Wellbutrin,, Klonopin, Vraylar, famotidine, Zofran, Seroquel, Wellbutrin,, Klonopin, Vraylar, famotidine, Zofran,   Istop confirmed Klonopin 2 mg was last prescribed 5/14 # 120 pills- mother confirmed 2 pills are left in bottle.

## 2021-06-14 NOTE — ED BEHAVIORAL HEALTH ASSESSMENT NOTE - HPI (INCLUDE ILLNESS QUALITY, SEVERITY, DURATION, TIMING, CONTEXT, MODIFYING FACTORS, ASSOCIATED SIGNS AND SYMPTOMS)
Patient is a 19 y/o M, single, domiciled with parents and siblings, graduated college May 2021, PMH of chronic nausea, PPHx of Bipolar d/o, 3 prior inpt psychiatric hospitalizations, currently in treatment with Dr. Yepez, as per charr patient has hx of SA, h/o Cannabis use,  no known hx of violence or aggression, presents to ED BIB [facility] after parents called 911 when patient was threatening to kill himself.   Patient reports he was out to lunch with his mother yesterday and he became angry when their conversation was interrupted by a phone call from his sister. Patient felt his mother should has devoted her full attention to him and should have quickly ended the conversation with his sister. After leaving the resteraunt patient claims he returned home and again became angry when he attempted to speak to his father about the event and his father walked away. Patient then left the home to  a baseball game. 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 While at the office the police arrived and was taken to [facility]. Patient reports his mother called 911 because he made a statement yesterday " I can't do this anymore." Patient denies this was a suicide statement and denies current or recent suicidal ideation. Patient reports PPH of bipolar disorder and is in current treatment with Dr. Yepez and attends psychotherapy weekly. He reports his mood has been " okay." and has been enjoying going out with his friends. Patient reports he is sleeping 6-8 hours/night, denies abnormal amounts of energy, risky behavior, racing thoughts or rapid pressured speech. Patient also denies any recent or past auditory/visual hallucinations, thoughts of paranoia or ideas of reference. Patient reports his family is under a lot of stress lately as his grandmother is dying, father is physically ill, and brothers good friend  by suicide.   Received collateral from mother Jeanne Carrera, who reports patient became agitated at lunch after sister called and interrupted their lunch.  When patient returned home he took the car and was texting suicidal statements while driving the car. Patient appeared manic and aggressive. He eventually returned home with intermittent outburst and verbal threats to commit suicide. Patient continued to threaten suicide intermittently throughout the evening and again this morning. Patient left for work this morning and sent his brother text threatening suicide.  Patient has h/o bipolar disorder and appears to be compliant with his medications. He often becomes irritable when he feels he is not getting enough attention. Most recently he appeared to be doing well. Patient displayed similar behavior in May 2020 prior to be hospitalized at [facility].  Received additional collateral from father Ti Carrera who confirm above collateral. Last evening patient again threatened suicide and walked to the nearby train station. He then called his parents and asked for them to pick him up. After patient returned home from the train station he left the house and went out with a friend. When he return home around 11 pm he was agitated and yelling at parents. Mr Carrera told patient he could not engage in this behavior and went to sleep. Patient called and text his parents throughout the night. Several text were suicide statements.  This morning patient appeared depressed and irritable. 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 Patient's brother called 911. Father is afraid patient will attempt suicide and agrees with hospitalization. Patient is a 19 y/o M, single, domiciled with parents and siblings, graduated college May 2021, PMH of chronic nausea, PPHx of Bipolar d/o, 3 prior inpt psychiatric hospitalizations, currently in treatment with Dr. Yepez, as per charr patient has hx of SA, h/o Cannabis use,  no known hx of violence or aggression, presents to ED BIB [place]/[facility] after parents called 911 when patient was threatening to kill himself.   Patient reports he was out to lunch with his mother yesterday and he became angry when their conversation was interrupted by a phone call from his sister. Patient felt his mother should has devoted her full attention to him and should have quickly ended the conversation with his sister. After leaving the resteraunt patient claims he returned home and again became angry when he attempted to speak to his father about the event and his father walked away. Patient then left the home to  a baseball game. 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 While at the office the police arrived and was taken to [facility]. Patient reports his mother called 911 because he made a statement yesterday " I can't do this anymore." Patient denies this was a suicide statement and denies current or recent suicidal ideation. Patient reports PPH of bipolar disorder and is in current treatment with Dr. Yepez and attends psychotherapy weekly. He reports his mood has been " okay." and has been enjoying going out with his friends. Patient reports he is sleeping 6-8 hours/night, denies abnormal amounts of energy, risky behavior, racing thoughts or rapid pressured speech. Patient also denies any recent or past auditory/visual hallucinations, thoughts of paranoia or ideas of reference. Patient reports his family is under a lot of stress lately as his grandmother is dying, father is physically ill, and brothers good friend  by suicide.   Received collateral from mother eJanne Carrera, who reports patient became agitated at lunch after sister called and interrupted their lunch.  When patient returned home he took the car and was texting suicidal statements while driving the car. Patient appeared manic and aggressive. He eventually returned home with intermittent outburst and verbal threats to commit suicide. Patient continued to threaten suicide intermittently throughout the evening and again this morning. Patient left for work this morning and sent his brother text threatening suicide.  Patient has h/o bipolar disorder and appears to be compliant with his medications. He often becomes irritable when he feels he is not getting enough attention. Most recently he appeared to be doing well. Patient displayed similar behavior in May 2020 prior to be hospitalized at [facility].  Received additional collateral from father Ti Carrera who confirm above collateral. Last evening patient again threatened suicide and walked to the nearby train station. He then called his parents and asked for them to pick him up. After patient returned home from the train station he left the house and went out with a friend. When he return home around 11 pm he was agitated and yelling at parents. Mr Carrera told patient he could not engage in this behavior and went to sleep. Patient called and text his parents throughout the night. Several text were suicide statements.  This morning patient appeared depressed and irritable. 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 Patient's brother called 911. Father is afraid patient will attempt suicide  and agrees with hospitalization. Patient is a 19 y/o M, single, domiciled with parents and siblings, graduated college May 2021, PMH of chronic nausea, PPHx of Bipolar d/o, 3 prior inpt psychiatric hospitalizations, currently in treatment with Dr. Yepez, as per charr patient has hx of SA, h/o Cannabis use, no known hx of violence or aggression, presents to ED BIB [place] after parents called 911 when patient was threatening to kill himself.   Patient reports he was out to lunch with his mother yesterday and he became angry when their conversation was interrupted by a phone call from his sister. Patient felt his mother should has devoted her full attention to him and should have quickly ended the conversation with his sister. After leaving the resteraunt patient claims he returned home and again became angry when he attempted to speak to his father about the event and his father walked away. Patient then left the home to  a baseball game. 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 While at the office the police arrived and was taken to [facility]. Patient reports his mother called 911 because he made a statement yesterday " I can't do this anymore." Patient denies this was a suicide statement and denies current or recent suicidal ideation. Patient reports PPH of bipolar disorder and is in current treatment with Dr. Yepez and attends psychotherapy weekly. He reports his mood has been " okay." and has been enjoying going out with his friends. Patient reports he is sleeping 6-8 hours/night, denies abnormal amounts of energy, risky behavior, racing thoughts or rapid pressured speech. Patient also denies any recent or past auditory/visual hallucinations, thoughts of paranoia or ideas of reference. Patient reports his family is under a lot of stress lately as his grandmother is dying, father is physically ill, and brothers good friend  by suicide.   Received collateral from mother Jeanne Carrera, who reports patient became agitated at lunch after sister called and interrupted their lunch.  When patient returned home he took the car and was texting suicidal statements while driving the car. Patient appeared manic and aggressive. He eventually returned home with intermittent outburst and verbal threats to commit suicide. Patient continued to threaten suicide intermittently throughout the evening and again this morning. Patient left for work this morning and sent his brother text threatening suicide.  Patient has h/o bipolar disorder and appears to be compliant with his medications. He often becomes irritable when he feels he is not getting enough attention. Most recently he appeared to be doing well. Patient displayed similar behavior in May 2020 prior to be hospitalized at [facility].  Received additional collateral from father Ti Carrera who confirm above collateral. Last evening patient again threatened suicide and walked to the nearby train station. He then called his parents and asked for them to pick him up. After patient returned home from the train station he left the house and went out with a friend. When he return home around 11 pm he was agitated and yelling at parents. Mr Carrera told patient he could not engage in this behavior and went to sleep. Patient called and text his parents throughout the night. Several text were suicide statements.  This morning patient appeared depressed and irritable. 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 Patient's brother called 911. Father is afraid patient will attempt suicide  and agrees with hospitalization.

## 2021-06-14 NOTE — ED BEHAVIORAL HEALTH ASSESSMENT NOTE - OTHER PAST PSYCHIATRIC HISTORY (INCLUDE DETAILS REGARDING ONSET, COURSE OF ILLNESS, INPATIENT/OUTPATIENT TREATMENT)
Outpatient tx with Dr. Yepez, psychotherapy weekly   prior hospitalizations at [facility] last 5/2020

## 2021-06-14 NOTE — ED BEHAVIORAL HEALTH ASSESSMENT NOTE - OTHER
mother preoccupied with not being admitted danger to others given poor impulse control, housing - wants to move out. 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

## 2021-06-14 NOTE — ED BEHAVIORAL HEALTH NOTE - BEHAVIORAL HEALTH NOTE
COVID Exposure Screen- Patient  1.	*Have you had a COVID-19 test in the last 90 days?  (  ) Yes   ( x ) No   (  ) Unknown- Reason: _____  IF YES PROCEED TO QUESTION #2. IF NO OR UNKNOWN, PLEASE SKIP TO QUESTION #3.  2.	Date of test(s) and result(s): ________  3.	*Have you tested positive for COVID-19 antibodies? (  ) Yes   ( x ) No   (  ) Unknown- Reason: _____  IF YES PROCEED TO QUESTION #4. IF NO or UNKNOWN, PLEASE SKIP TO QUESTION #5.  4.	Date of positive antibody test: ________  5.	*Have you received 2 doses of the COVID-19 vaccine? (  x) Yes   (  ) No   (  ) Unknown- Reason: _____   IF YES PROCEED TO QUESTION #6. IF NO or UNKNOWN, PLEASE SKIP TO QUESTION #7.  6.	Date of second dose: ___4/2021_____  7.	*In the past 10 days, have you been around anyone with a positive COVID-19 test?* (  ) Yes   ( x ) No   (  ) Unknown- Reason: ____  IF YES PROCEED TO QUESTION #8. IF NO or UNKNOWN, PLEASE SKIP TO QUESTION #13.  8.	Were you within 6 feet of them for at least 15 minutes? (  ) Yes   (  ) No   (  ) Unknown- Reason: _____  9.	Have you provided care for them? (  ) Yes   (  ) No   (  ) Unknown- Reason: ______  10.	Have you had direct physical contact with them (touched, hugged, or kissed them)? (  ) Yes   (  ) No    (  ) Unknown- Reason: _____  11.	Have you shared eating or drinking utensils with them? (  ) Yes   (  ) No    (  ) Unknown- Reason: ____  12.	Have they sneezed, coughed, or somehow gotten respiratory droplets on you? (  ) Yes   (  ) No    (  ) Unknown- Reason: ______  13.	*Have you been out of [place] within the past 10 days?* (  x) Yes   (  ) No   (  ) Unknown- Reason: _____  IF YES PLEASE ANSWER THE FOLLOWING QUESTIONS:  14.	Which state/country have you been to? __[place], [place]____  15.	Were you there over 24 hours? (x  ) Yes   (  ) No    (  ) Unknown- Reason: ______  16.	Date of return to [facility]: ___6/10___ Detail Level: Detailed

## 2021-06-15 DIAGNOSIS — F31.9 BIPOLAR DISORDER, UNSPECIFIED: ICD-10-CM

## 2021-06-15 LAB
COVID-19 SPIKE DOMAIN AB INTERP: POSITIVE
COVID-19 SPIKE DOMAIN ANTIBODY RESULT: >250 U/ML — HIGH
SARS-COV-2 IGG+IGM SERPL QL IA: >250 U/ML — HIGH
SARS-COV-2 IGG+IGM SERPL QL IA: POSITIVE
SARS-COV-2 RNA SPEC QL NAA+PROBE: SIGNIFICANT CHANGE UP

## 2021-06-15 PROCEDURE — 99222 1ST HOSP IP/OBS MODERATE 55: CPT

## 2021-06-15 RX ORDER — CARIPRAZINE 1.5 MG/1
3 CAPSULE, GELATIN COATED ORAL DAILY
Refills: 0 | Status: DISCONTINUED | OUTPATIENT
Start: 2021-06-15 | End: 2021-06-15

## 2021-06-15 RX ORDER — CARIPRAZINE 1.5 MG/1
3 CAPSULE, GELATIN COATED ORAL
Refills: 0 | Status: DISCONTINUED | OUTPATIENT
Start: 2021-06-15 | End: 2021-06-16

## 2021-06-15 RX ADMIN — FAMOTIDINE 20 MILLIGRAM(S): 10 INJECTION INTRAVENOUS at 08:54

## 2021-06-15 RX ADMIN — CARIPRAZINE 3 MILLIGRAM(S): 1.5 CAPSULE, GELATIN COATED ORAL at 21:54

## 2021-06-15 RX ADMIN — QUETIAPINE FUMARATE 200 MILLIGRAM(S): 200 TABLET, FILM COATED ORAL at 20:26

## 2021-06-15 RX ADMIN — Medication 2 MILLIGRAM(S): at 20:27

## 2021-06-15 RX ADMIN — Medication 2 MILLIGRAM(S): at 08:54

## 2021-06-15 RX ADMIN — Medication 2 MILLIGRAM(S): at 12:56

## 2021-06-15 NOTE — PSYCHIATRIC REHAB INITIAL EVALUATION - NSBHEDULEVEL_PSY_ALL_CORE
As per chart, pt recently graduated from [employer] with a masters degree in communication./Graduate Degree

## 2021-06-15 NOTE — BH INPATIENT PSYCHIATRY ASSESSMENT NOTE - RISK ASSESSMENT
Risk factors for harm to self and others include bipolar, depression (possible mixed episode), recent suicidal ideation, poor impulse control, erratic/dangerous behavior, substance abuse, poor insight. Protective factors include that patient has good family supports and is engaged in treatment. Currently in treatment. Chronic risk factors for harm to self and others include h/o bipolar disorder, cluster B personality traits, documented history of past SA, history of past psych hospitalizations, and male gender. Acute risk factors were activating stressor of arguing with parents leading to emotional dysregulation and vague suicidal statements and gesture, now mitigated with inpatient admission and reconciliation with family. Additional protective factors include no active mood episode, no active suicidal ideation, intent or plan, history of help seeking behavior, patient has good family supports, positive + active  therapeutic relationships therapist and psychiatrist, stable financial situation, stable housing, and no access to firearms. Given the above, patient has elevated CHRONIC risk of harm to self/others. His ACUTE risk may also be acutely elevated given his recent behavior and concern of outpatient psychiatrist, warranting further observation on inpatient unit.  Some concern for elevated acute risk given emotional dysregulation and poor judgment/impulsivity in acting out behaviors that included dramatic provocative suicidal content.  However as this appears to be more consistent with personality traits vs full disorder and long term discordant family dynamic, presentation may end up being reflection of chronic risk rather than acute/imminent.

## 2021-06-15 NOTE — BH INPATIENT PSYCHIATRY ASSESSMENT NOTE - NSBHMETABOLIC_PSY_ALL_CORE_FT
BMI: BMI (kg/m2): 25.1 (06-14-21 @ 13:47)  HbA1c: A1C with Estimated Average Glucose: 5.4 % (07-27-20 @ 08:15)    Glucose:   BP: 122/64 (06-15-21 @ 02:40) (102/66 - 132/60)  Lipid Panel: Date/Time: 07-27-20 @ 08:15  Cholesterol, Serum: 158  Direct LDL: 91  HDL Cholesterol, Serum: 56  Total Cholesterol/HDL Ration Measurement: --  Triglycerides, Serum: 66

## 2021-06-15 NOTE — BH INPATIENT PSYCHIATRY ASSESSMENT NOTE - NSBHCHARTREVIEWVS_PSY_A_CORE FT
Vital Signs Last 24 Hrs  T(C): 36.2 (15 Gamaliel 2021 07:51), Max: 36.8 (14 Jun 2021 18:04)  T(F): 97.1 (15 Gamaliel 2021 07:51), Max: 98.3 (14 Jun 2021 18:04)  HR: 80 (15 Gamaliel 2021 02:40) (80 - 110)  BP: 122/64 (15 Gamaliel 2021 02:40) (102/66 - 122/64)  BP(mean): --  RR: 18 (15 Gamaliel 2021 07:51) (16 - 18)  SpO2: 100% (15 Gamaliel 2021 02:40) (100% - 100%)

## 2021-06-15 NOTE — BH PATIENT PROFILE - HOME MEDICATIONS
ondansetron 4 mg oral tablet, disintegrating , 1 tab(s) orally every 8 hours   LORazepam 1 mg oral tablet , 1 tab(s) orally 3 times a day, As needed, Anxiety MDD:3mg  QUEtiapine 200 mg oral tablet , 1 tab(s) orally once a day (at bedtime)   QUEtiapine 50 mg oral tablet , 1 tab(s) orally once a day (at bedtime)   doxazosin 2 mg oral tablet , 3 tab(s) orally once a day (at bedtime)

## 2021-06-15 NOTE — PSYCHIATRIC REHAB INITIAL EVALUATION - NSBHALCSUBTREAT_PSY_ALL_CORE
As per chart, pt has history of 3 prior inpatient hospitalizations and is in weekly outpatient treatment with Dr. Yepez.

## 2021-06-15 NOTE — ED ADULT NURSE REASSESSMENT NOTE - NS ED NURSE REASSESS COMMENT FT1
Pt is currently sleeping, NAD, even unlabored respirations observed. Pending covid result for psychiatric admission. Will continue to monitor for safety.

## 2021-06-15 NOTE — BH INPATIENT PSYCHIATRY ASSESSMENT NOTE - CURRENT MEDICATION
MEDICATIONS  (STANDING):  cariprazine 3 milliGRAM(s) Oral daily  clonazePAM  Tablet 2 milliGRAM(s) Oral three times a day  famotidine    Tablet 20 milliGRAM(s) Oral daily  QUEtiapine 200 milliGRAM(s) Oral at bedtime    MEDICATIONS  (PRN):  OLANZapine 5 milliGRAM(s) Oral every 6 hours PRN agitation  OLANZapine Injectable 10 milliGRAM(s) IntraMuscular every 8 hours PRN severe agitation  ondansetron    Tablet 4 milliGRAM(s) Oral every 8 hours PRN Nausea

## 2021-06-15 NOTE — BH INPATIENT PSYCHIATRY ASSESSMENT NOTE - HPI (INCLUDE ILLNESS QUALITY, SEVERITY, DURATION, TIMING, CONTEXT, MODIFYING FACTORS, ASSOCIATED SIGNS AND SYMPTOMS)
Patient is a 19 y/o M, single, domiciled with parents and siblings, graduated college May 2021, PMH of chronic nausea, PPHx of Bipolar d/o, 3 prior inpt psychiatric hospitalizations, currently in treatment with Dr. Yepez, as per chart patient has hx of SA, h/o Cannabis use, no known hx of violence or aggression, presented to ED [place]IB [place]/[employer] after brother called 911 when patient was threatening to kill himself, now admitted to [facility]  [place].     Patient seen and examined by primary treatment team on [address]. He is adamant that he is not a threat to himself or others, and that he was making vague suicidal statements to try to get attention. Provides narrative that on  he was out to lunch with his mom and became upset when she was paying attention to a phone call with his sister instead of paying attention to him, and that she was not talking about issues that were important to him like his upcoming plans for moving out of the house. He had a baseball game to  after this, but forgot his baseball shoes at home. He says he went home to get them and at this point got into an argument with his dad because he felt dad was not supporting him the way he needed him to. Patient left for baseball, but continued to call and text his dad expressing his displeasure with the way his dad was not being supportive. Dad then went to the baseball field, they continued arguing, then patient drove away. Came back home, at which point mom took the keys from him because she did want him driving when he was so angry. Patient says that he was "forced" to leave the house, and that he went out angrily to walk. Says that he had a choice of 2 directions to walk in, but that one of the directions just leads to a bunch of houses and there is no where to sit down, so he decided to go in the other direction where the train station is. Says that he went to the train station, and later was picked up by his brother. Patient admits that throughout this day during arguments he had been making vague suicidal statements like "I can't do this anymore." Then back at home he went out to the backyard with his dad and had a good conversation, and then eventually his mom and brother joined and they all spoke pleasantly. Then patient says he went out for ice cream with a friend. Came home and dad approached him asking if he wanted to talk, and at this point patient got upset with his dad again for not leaving him alone and not being supportive in the way that he needed. That night kept trying to call and text dad but dad had turned his phone off. Next morning was very upset. Continued arguing with family and making vague suicidal statements. Went to work, while at work he says family called EMS out of concern for his safety. Patient says that he was making these vague suicidal statements for attention, and regrets doing this. He denies current or recent SI/HI. Denies feeling depressive and manic symptoms now or within last few weeks. Says that on  he had been feeling good. Says that over the last few weeks he has been         Patient reports he was out to lunch with his mother yesterday and he became angry when their conversation was interrupted by a phone call from his sister. Patient felt his mother should has devoted her full attention to him and should have quickly ended the conversation with his sister. After leaving the resteraunt patient claims he returned home and again became angry when he attempted to speak to his father about the event and his father walked away. Patient then left the home to  a baseball game. 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 While at the office the police arrived and was taken to [facility]. Patient reports his mother called 911 because he made a statement yesterday " I can't do this anymore." Patient denies this was a suicide statement and denies current or recent suicidal ideation. Patient reports PPH of bipolar disorder and is in current treatment with Dr. Yepez and attends psychotherapy weekly. He reports his mood has been " okay." and has been enjoying going out with his friends. Patient reports he is sleeping 6-8 hours/night, denies abnormal amounts of energy, risky behavior, racing thoughts or rapid pressured speech. Patient also denies any recent or past auditory/visual hallucinations, thoughts of paranoia or ideas of reference. Patient reports his family is under a lot of stress lately as his grandmother is dying, father is physically ill, and brothers good friend  by suicide.   Received collateral from mother Jeanne Carrera, who reports patient became agitated at lunch after sister called and interrupted their lunch.  When patient returned home he took the car and was texting suicidal statements while driving the car. Patient appeared manic and aggressive. He eventually returned home with intermittent outburst and verbal threats to commit suicide. Patient continued to threaten suicide intermittently throughout the evening and again this morning. Patient left for work this morning and sent his brother text threatening suicide.  Patient has h/o bipolar disorder and appears to be compliant with his medications. He often becomes irritable when he feels he is not getting enough attention. Most recently he appeared to be doing well. Patient displayed similar behavior in May 2020 prior to be hospitalized at [facility].  Received additional collateral from father Ti Carrera who confirm above collateral. Last evening patient again threatened suicide and walked to the nearby train station. He then called his parents and asked for them to pick him up. After patient returned home from the train station he left the house and went out with a friend. When he return home around 11 pm he was agitated and yelling at parents. Mr Carrera told patient he could not engage in this behavior and went to sleep. Patient called and text his parents throughout the night. Several text were suicide statements.  This morning patient appeared depressed and irritable. 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 Patient's brother called 911. Father is afraid patient will attempt suicide  and agrees with hospitalization. Patient is a 19 y/o M, single, domiciled with parents and siblings, graduated college May 2021, PMH of chronic nausea, PPHx of Bipolar d/o, 3 prior inpt psychiatric hospitalizations, currently in treatment with Dr. Yepez, as per chart patient has hx of SA, h/o Cannabis use, no known hx of violence or aggression, presented to ED [place] after brother called 911 when patient was threatening to kill himself, now admitted to [facility].     Patient seen and examined by primary treatment team on [address]. He is adamant that he is not a threat to himself or others, and that he was making vague suicidal statements to try to get attention. Provides narrative that on  he was out to lunch with his mom and became upset when she was paying attention to a phone call with his sister instead of paying attention to him, and that she was not talking about issues that were important to him like his upcoming plans for moving out of the house. He had a baseball game to  after this, but forgot his baseball shoes at home. He says he went home to get them and at this point got into an argument with his dad because he felt dad was not supporting him the way he needed him to. Patient left for baseball, but continued to call and text his dad expressing his displeasure with the way his dad was not being supportive. Dad then went to the baseball field, they continued arguing, then patient drove away. Came back home, at which point mom took the keys from him because she did want him driving when he was so angry. Patient says that he was "forced" to leave the house, and that he went out angrily to walk. Says that he had a choice of 2 directions to walk in, but that one of the directions just leads to a bunch of houses and there is no where to sit down, so he decided to go in the other direction where the train station is. Says that he went to the train station, and just sat down there, never going onto the tracks and says he had no intent or plan of jumping in front of train, and later was picked up by his family after he asked them to come get him. Patient admits that throughout this day during arguments he had been making vague suicidal statements like "I can't do this anymore." Then back at home he went out to the backyard with his dad and had a good conversation, and then eventually his mom and brother joined and they all spoke pleasantly. Then patient says he went out for ice cream with a friend. Came home and dad approached him asking if he wanted to talk, and at this point patient got upset with his dad again for not leaving him alone and not being supportive in the way that he needed. That night kept trying to call and text dad but dad had turned his phone off. Next morning was very upset. Continued arguing with family and making vague suicidal statements. Went to work, while at work he says family called EMS out of concern for his safety. Patient says that he was making these vague suicidal statements for attention, and regrets doing this. He denies current or recent SI/HI. Denies feeling depressive and manic symptoms now or within last few weeks. Says that on  he had been feeling good. 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     Collateral - spoke with patient's dad, psychiatrist and therapist, documented below:    Dad - Ti Carrera: Corroborates above timeline. Adds/clarifies that on , during their argument in the parking lot of the baseball field where patient went to  baseball, the patient angrily stated he (the patient) is going to end up like the family friend who committed suicide 2 weeks prior by jumping in front of a train, and made comments about jumping onto the train tracks. However, patient ended up driving home, at which point mom took his car keys away. Later that afternoon (around 5 PM) when patient left house to walk, he walked to the train station, and at one point while at the station texted his brother saying that he was on the tracks, though dad does not think that he actually ever went onto the tracks but was just saying this to be provocative. According to dad, brother said that he was going to come get patient, but that patient requested that parents come get him. Dad says that he went to go get patient, found him sitting on a bench at the station, and that patient willingly got up and went in the car with him and they drove home. The next morning, patient continued to argue with dad. 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 From there per dad patient continued to text vague suicidal statements to his brother like "that's is," "this is everyone's fault," "goodbye," and "you'll never see me again." During this time dad says that he and mom both drove by the parking lot of this office building and saw that patient's car never left the lot. During this time, brother became concerned and called EMS. Mom drove over the patient's office building and advocated to EMS for him to go the ED. Today, dad says that he is "four hundred percent certain" that patient was making suicidal statements for attention, and that he is not actually at risk of killing himself.     Psychiatrist - Dr. Yepez: At first expresses concern for ruling out penny and agitated depression, but after learning more about patient's presentation, feels that presentation more consistent with emotional dysregulation due to borderline traits and a result of poor judgement and impulsivity, and that patient would benefit from DBT, and may not have a lot of therapeutic benefit from being hospitalized on a unit without DBT groups. Does say that he would recommend further observation.     Therapist - Dr. Wright: States that she met with patient remotely on  while patient was at work at around 10:30 am after the patient reached out to her. Says that patient was agitated at this time and saying "I can't do this anymore." Says that she did a risk assessment at this time and did NOT think that the patient was suicidal. The patient was denying SI, and saying that he was just really upset. They had planned to check in again later in the day, but before that family called EMS. Says that in addition to historical diagnosis of bipolar, that patient has borderline traits, and has a history of relationship difficulties with his parents which make him very frustrated and upset. Says that Maulik is a patient who is help-seeking, on time to appointments and communicative if he will be late or has to reschedule. She does NOT have objections to him being discharged. She thinks that his current presentation is consistent with emotional dysregulation from borderline traits, and that he will benefit more at this point from outpatient treatment rather than what the inpatient unit can offer him. She plans to encourage him to start attending DBT groups.     Per ED assessment:    "Patient reports he was out to lunch with his mother yesterday and he became angry when their conversation was interrupted by a phone call from his sister. Patient felt his mother should has devoted her full attention to him and should have quickly ended the conversation with his sister. After leaving the resteraunt patient claims he returned home and again became angry when he attempted to speak to his father about the event and his father walked away. Patient then left the home to  a baseball game. 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 While at the office the police arrived and was taken to [facility]. Patient reports his mother called 911 because he made a statement yesterday " I can't do this anymore." Patient denies this was a suicide statement and denies current or recent suicidal ideation. Patient reports PPH of bipolar disorder and is in current treatment with Dr. Yepez and attends psychotherapy weekly. He reports his mood has been " okay." and has been enjoying going out with his friends. Patient reports he is sleeping 6-8 hours/night, denies abnormal amounts of energy, risky behavior, racing thoughts or rapid pressured speech. Patient also denies any recent or past auditory/visual hallucinations, thoughts of paranoia or ideas of reference. Patient reports his family is under a lot of stress lately as his grandmother is dying, father is physically ill, and brothers good friend  by suicide.   Received collateral from mother Jeanne Carrera, who reports patient became agitated at lunch after sister called and interrupted their lunch.  When patient returned home he took the car and was texting suicidal statements while driving the car. Patient appeared manic and aggressive. He eventually returned home with intermittent outburst and verbal threats to commit suicide. Patient continued to threaten suicide intermittently throughout the evening and again this morning. Patient left for work this morning and sent his brother text threatening suicide.  Patient has h/o bipolar disorder and appears to be compliant with his medications. He often becomes irritable when he feels he is not getting enough attention. Most recently he appeared to be doing well. Patient displayed similar behavior in May 2020 prior to be hospitalized at [facility].  Received additional collateral from father Ti Carrera who confirm above collateral. Last evening patient again threatened suicide and walked to the nearby train station. He then called his parents and asked for them to pick him up. After patient returned home from the train station he left the house and went out with a friend. When he return home around 11 pm he was agitated and yelling at parents. Mr Carrera told patient he could not engage in this behavior and went to sleep. Patient called and text his parents throughout the night. Several text were suicide statements.  This morning patient appeared depressed and irritable. 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 Patient's brother called 911. Father is afraid patient will attempt suicide  and agrees with hospitalization." Patient is a 21 y/o M, single, domiciled with parents and siblings, graduated college May 2021, PMH of chronic nausea, PPHx of Bipolar d/o, 3 prior inpt psychiatric hospitalizations, currently in treatment with Dr. Yepez, as per chart patient has hx of SA, h/o Cannabis use, no known hx of violence or aggression, presented to ED [place] after brother called 911 when patient was threatening to kill himself, now admitted to [facility].     Patient seen and examined by primary treatment team on [address]. He is adamant that he is not a threat to himself or others, and that he was making vague suicidal statements to try to get attention. Provides narrative that on  he was out to lunch with his mom and became upset when she was paying attention to a phone call with his sister instead of paying attention to him, and that she was not talking about issues that were important to him like his upcoming plans for moving out of the house. He had a baseball game to  after this, but forgot his baseball shoes at home. He says he went home to get them and at this point got into an argument with his dad because he felt dad was not supporting him the way he needed him to. Patient left for baseball, but continued to call and text his dad expressing his displeasure with the way his dad was not being supportive. Dad then went to the baseball field, they continued arguing, then patient drove away. Came back home, at which point mom took the keys from him because she did want him driving when he was so angry. Patient says that he was "forced" to leave the house, and that he went out angrily to walk. Says that he had a choice of 2 directions to walk in, but that one of the directions just leads to a bunch of houses and there is no where to sit down, so he decided to go in the other direction where the train station is. Says that he went to the train station, and just sat down there, never going onto the tracks and says he had no intent or plan of jumping in front of train, and later was picked up by his family after he asked them to come get him. Patient admits that throughout this day during arguments he had been making vague suicidal statements like "I can't do this anymore." Then back at home he went out to the backyard with his dad and had a good conversation, and then eventually his mom and brother joined and they all spoke pleasantly. Then patient says he went out for ice cream with a friend. Came home and dad approached him asking if he wanted to talk, and at this point patient got upset with his dad again for not leaving him alone and not being supportive in the way that he needed. That night kept trying to call and text dad but dad had turned his phone off. Next morning was very upset. Continued arguing with family and making vague suicidal statements. Went to work, while at work he says family called EMS out of concern for his safety. Patient says that he was making these vague suicidal statements for attention, and regrets doing this. He denies current or recent SI/HI. Denies feeling depressive and manic symptoms now or within last few weeks. Says that on  he had been feeling good. 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     Collateral - spoke with patient's dad, psychiatrist and therapist, documented below:    Dad - Ti Carrera: Corroborates above timeline. Adds/clarifies that on , during their argument in the parking lot of the baseball field where patient went to  baseball, the patient angrily stated he (the patient) is going to end up like the family friend who committed suicide 2 weeks prior by jumping in front of a train, and made comments about jumping onto the train tracks. However, patient ended up driving home, at which point mom took his car keys away. Later that afternoon (around 5 PM) when patient left house to walk, he walked to the train station, and at one point while at the station texted his brother saying that he was on the tracks, though dad does not think that he actually ever went onto the tracks but was just saying this to be provocative. According to dad, brother said that he was going to come get patient, but that patient requested that parents come get him. Dad says that he went to go get patient, found him sitting on a bench at the station, and that patient willingly got up and went in the car with him and they drove home. The next morning, patient continued to argue with dad. 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 From there per dad patient continued to text vague suicidal statements to his brother like "that's is," "this is everyone's fault," "goodbye," and "you'll never see me again." During this time dad says that he and mom both drove by the parking lot of this office building and saw that patient's car never left the lot. During this time, brother became concerned and called EMS. Mom drove over the patient's office building and advocated to EMS for him to go the ED. Today, dad says that he is "four hundred percent certain" that patient was making suicidal statements for attention, and that he is not actually at risk of killing himself. Dad says that on 21 he went with patient and brother to [employer] game, and that they have a fun day together with patient feeling good. Denies depressive and manic symptoms over the past several weeks, stating that patient had been doing well.     Psychiatrist - Dr. Yepez: At first expresses concern for ruling out penny and agitated depression, but after learning more about patient's presentation, feels that presentation more consistent with emotional dysregulation due to borderline traits and a result of poor judgement and impulsivity, and that patient would benefit from DBT, and may not have a lot of therapeutic benefit from being hospitalized on a unit without DBT groups. Does say that he would recommend further observation.     Therapist - Dr. Wright: States that she met with patient remotely on  while patient was at work at around 10:30 am after the patient reached out to her. Says that patient was agitated at this time and saying "I can't do this anymore." Says that she did a risk assessment at this time and did NOT think that the patient was suicidal. The patient was denying SI, and saying that he was just really upset. They had planned to check in again later in the day, but before that family called EMS. Says that in addition to historical diagnosis of bipolar, that patient has borderline traits, and has a history of relationship difficulties with his parents which make him very frustrated and upset. Says that Maulik is a patient who is help-seeking, on time to appointments and communicative if he will be late or has to reschedule. She does NOT have objections to him being discharged. She thinks that his current presentation is consistent with emotional dysregulation from borderline traits, and that he will benefit more at this point from outpatient treatment rather than what the inpatient unit can offer him. She plans to encourage him to start attending DBT groups.     Per ED assessment:    "Patient reports he was out to lunch with his mother yesterday and he became angry when their conversation was interrupted by a phone call from his sister. Patient felt his mother should has devoted her full attention to him and should have quickly ended the conversation with his sister. After leaving the resteraunt patient claims he returned home and again became angry when he attempted to speak to his father about the event and his father walked away. Patient then left the home to  a baseball game. 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 While at the office the police arrived and was taken to [facility]. Patient reports his mother called 911 because he made a statement yesterday " I can't do this anymore." Patient denies this was a suicide statement and denies current or recent suicidal ideation. Patient reports PPH of bipolar disorder and is in current treatment with Dr. Yepez and attends psychotherapy weekly. He reports his mood has been " okay." and has been enjoying going out with his friends. Patient reports he is sleeping 6-8 hours/night, denies abnormal amounts of energy, risky behavior, racing thoughts or rapid pressured speech. Patient also denies any recent or past auditory/visual hallucinations, thoughts of paranoia or ideas of reference. Patient reports his family is under a lot of stress lately as his grandmother is dying, father is physically ill, and brothers good friend  by suicide.   Received collateral from mother Jeanne Carrera, who reports patient became agitated at lunch after sister called and interrupted their lunch.  When patient returned home he took the car and was texting suicidal statements while driving the car. Patient appeared manic and aggressive. He eventually returned home with intermittent outburst and verbal threats to commit suicide. Patient continued to threaten suicide intermittently throughout the evening and again this morning. Patient left for work this morning and sent his brother text threatening suicide.  Patient has h/o bipolar disorder and appears to be compliant with his medications. He often becomes irritable when he feels he is not getting enough attention. Most recently he appeared to be doing well. Patient displayed similar behavior in May 2020 prior to be hospitalized at [facility].  Received additional collateral from father Ti Carrera who confirm above collateral. Last evening patient again threatened suicide and walked to the nearby train station. He then called his parents and asked for them to pick him up. After patient returned home from the train station he left the house and went out with a friend. When he return home around 11 pm he was agitated and yelling at parents. Mr Carrera told patient he could not engage in this behavior and went to sleep. Patient called and text his parents throughout the night. Several text were suicide statements.  This morning patient appeared depressed and irritable. 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 Patient's brother called 911. Father is afraid patient will attempt suicide  and agrees with hospitalization." Patient is a 19 y/o M, single, domiciled with parents and siblings, graduated college May 2021, PMH of chronic nausea, PPHx of Bipolar d/o, 3 prior inpt psychiatric hospitalizations, currently in treatment with Dr. Yepez, as per chart patient has hx of SA, h/o Cannabis use, no known hx of violence or aggression, presented to ED BIB [place]/[facility] after brother called 911 when patient was sending suicidal texts, now admitted to [facility].     Patient seen and examined by primary treatment team on [address] [place]. He is adamant that he is not a threat to himself or others, and that he was expressing passive thoughts implying death to try to get attention.  He denies ever making statements or texts with overt suicidal ideation.  Provides narrative that on Sunday 6/13 he was out to lunch with his mom and became upset when she was paying attention to a phone call with his sister instead of paying attention to him, and that she was not talking about issues that were important to him like his upcoming plans for moving out of the house. He had a baseball game to  after this, but forgot his baseball shoes at home. He says he went home to get them and at this point got into an argument with his dad because he felt dad was not supporting him the way he needed him to. Patient left for baseball, but continued to call and text his dad expressing his displeasure with the way his dad was not being supportive. Dad then went to the baseball field, they continued arguing, then patient drove away. Came back home, at which point mom took the keys from him because she did want him driving when he was so angry. Patient says that he was "forced" to leave the house, and that he went out angrily to walk. Says that he had a choice of 2 directions to walk in, but that one of the directions just leads to a bunch of houses and there is no where to sit down, so he decided to go in the other direction where the train station is. Says that he went to the train station, and just sat down there, never going onto the tracks and says he had no intent or plan of jumping in front of train, and later was picked up by his family after he asked them to come get him. Patient admits that throughout this day during arguments he had been making statements like "I can't do this anymore." Then back at home he went out to the backyard with his dad and had a good conversation, and then eventually his mom and brother joined and they all spoke pleasantly. Then patient says he went out for ice cream with a friend. Came home and dad approached him asking if he wanted to talk, and at this point patient got upset with his dad again for not leaving him alone and not being supportive in the way that he needed. That night kept trying to call and text dad but dad had turned his phone off. Next morning was very upset. Continued arguing with family and continuing to make provocative statements for attention, which he now regrets doing. Went to work, while at work he says family called EMS out of concern for his safety.  He denies current or recent SI/HI. Denies feeling depressive and manic symptoms now or within last few weeks. Says that on Saturday 6/12 he had been feeling good. 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     Collateral - spoke with patient's dad, psychiatrist and therapist, documented below:    Dad - Ti Carrera: Corroborates above timeline. Adds/clarifies that on Sunday 6/13, during their argument in the parking lot of the baseball field where patient went to  baseball, the patient angrily stated he (the patient) is going to end up like the family friend who completed suicide 2 weeks prior by jumping in front of a train, and made comments about jumping onto the train tracks. However, patient ended up driving home, at which point mom took his car keys away. Later that afternoon (around 5 PM) when patient left house to walk, he walked to the train station, and at one point while at the station texted his brother saying that he was on the tracks, though dad does not think that he actually ever went onto the tracks but was just saying this to be provocative. According to dad, brother said that he was going to come get patient, but that patient requested that parents come get him. Dad says that he went to go get patient, found him sitting on a bench at the station, and that patient willingly got up and went in the car with him and they drove home. The next morning, patient continued to argue with dad. 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 From there per dad patient continued to text vague  statements to his brother like "that's it," "this is everyone's fault," "goodbye," and "you'll never see me again." During this time dad says that he and mom both drove by the parking lot of this office building and saw that patient's car never left the lot. During this time, brother became concerned and called EMS. Mom drove over the patient's office building and advocated to EMS for him to go the ED. Today, dad says that he is "four hundred percent certain" that patient was making provocative vague statements for attention, and that he is not actually at risk of killing himself. Dad says that on Saturday 6/12/21 he went with patient and brother to [employer] game, and that they have a fun day together with patient feeling good. Denies depressive and manic symptoms over the past several weeks, stating that patient had been doing well.     Psychiatrist - Dr. Yepez: States that presentation may be consistent with emotional dysregulation due to borderline traits with associated poor judgment and impulsivity, and that patient would benefit from DBT, and may not have a lot of therapeutic benefit from being hospitalized on a unit without DBT groups.  Does say that he would recommend further observation.     Therapist - Dr. Wright: States that she met with patient remotely on Monday 6/14 while patient was at work at around 10:30 am after the patient reached out to her. Says that patient was agitated at this time and saying "I can't do this anymore." Says that she did a risk assessment at this time and did NOT think that the patient was suicidal. The patient was denying SI, and saying that he was just really upset. They had planned to check in again later in the day, but before that family called EMS. Says that in addition to historical diagnosis of bipolar, that patient has borderline traits, and has a history of relationship difficulties with his parents which make him very frustrated and upset. Says that patient is help-seeking, on time to appointments and communicative if he will be late or has to reschedule. She does NOT have objections to him being discharged. She thinks that his current presentation is consistent with emotional dysregulation from borderline traits, and that he will benefit more at this point from outpatient treatment rather than what the inpatient unit can offer him. She plans to encourage him to start attending DBT groups.

## 2021-06-15 NOTE — PSYCHIATRIC REHAB INITIAL EVALUATION - NSBHPRRECOMMEND_PSY_ALL_CORE
Psychiatric rehabilitation staff approached patient to orient him to psychiatric rehabilitation staff and services. Patient was observed laying down in bed and pt reported “I would rather not talk right now. I feel like I have told so many people my story already.” Due to pt refusing assessment with writer, pt’s information will be obtained from pt’s chart records and pt’s treatment team.      Patient was observed with a reportedly "tired” mood and a congruent affect during initial assessment. Patient was observed with fair ADLs. Patient spoke in a normal tone of voice and at a normal rate. As per chart, Patient’s thought process is assessed as linear and his insight and judgment are poor.       As per chart, pt was admitted to the ED by [place] after his parents called 911 when pt was threatening to kill himself. As per chart, pt was out to lunch with his mother yesterday and he became angry when their conversation was interrupted by a phone call from his sister. As per chart and pt’s parents, pt appeared manic, aggressive, and has been threatening suicide through text messages and verbal communication. As per chart, pt has history of bipolar disorder, 3 prior inpatient hospitalizations, currently in treatment with Dr. Yepez, history of SA, and history of cannabis use. As per chart, pt denied A/V hallucinations, racing thoughts, abnormal amounts of energy, and poor sleep.

## 2021-06-15 NOTE — BH INPATIENT PSYCHIATRY ASSESSMENT NOTE - NSBHASSESSSUMMFT_PSY_ALL_CORE
Patient is a 19 y/o M, with historical diagnosis of bipolar disorder, documented history of past SA in 2015, h/o past hospitalizations, and h/o borderline traits and relational difficulties with patients leading to emotional dysregulation, who is admitted after being brought in by EMS activated by brother for vague suicidal statements.     Patient's presentation is most consistent with emotional dysregulation from borderline traits in the setting of activating stressor of arguing with parents, which for the patient is a chronic issue. On exam, subjective report, and per collateral, patient had been doing in the weeks leading to admission, without evidence of manic or depressive episode. His emotional which is a chronic issue for the patient. The patient has maintained that he not had any suicidal intent or plan, that he never had did go on the train tracks and never had any intent of doing so, and that his vague suicidal statements have been an attempt to get attention from his family, which he now regrets. His father is in agreement with this sentiment. His therapist who evaluated him just prior to EMS being called also felt that the patient was not suicidal. His psychiatrist is in agreement with diagnosis, but is still suggesting more observation on the unit. As such, while patient's suicidal statements appear to be related to chronic borderline traits which will benefit more from outpatient treatment, and not a result of immediately modifiable psychiatric illness like penny or depressive episode, he will be further observed on the unit.     - admit on 9.39  - safety: Q15 checks  - standing meds: continue Vrylar 3 mg qHS, Seroquel 200 mg qHS Klonopin 2 mg TID, and Wellbutrin  mg daily

## 2021-06-15 NOTE — BH INPATIENT PSYCHIATRY ASSESSMENT NOTE - NSDCCRITERIA_PSY_ALL_CORE
when patient is no longer at acutely elevated risk of harm to self.  When pt is no longer an acute or imminent risk of harm to self or others, and is able to care for self safely, pt may then be discharged.

## 2021-06-15 NOTE — BH INPATIENT PSYCHIATRY ASSESSMENT NOTE - OTHER PAST PSYCHIATRIC HISTORY (INCLUDE DETAILS REGARDING ONSET, COURSE OF ILLNESS, INPATIENT/OUTPATIENT TREATMENT)
Outpatient tx with Dr. Yepez, psychotherapy weekly   prior hospitalizations at [facility] last 5/2020 Diagnoses of bipolar I disorder, PTSD, borderline traits vs full personality disorder.  Psychiatrist Dr. Yepez and Psychotherapist Dr. Wright.    Prior hospitalization at [facility] in 5/2020.

## 2021-06-15 NOTE — BH SOCIAL WORK INITIAL PSYCHOSOCIAL EVALUATION - NSBHHOUSECOMMENTFT_PSY_ALL_CORE
Pt resides with is parents, older brother and younger sister. Reports a generally good relationship with all.

## 2021-06-15 NOTE — BH SOCIAL WORK INITIAL PSYCHOSOCIAL EVALUATION - OTHER PAST PSYCHIATRIC HISTORY (INCLUDE DETAILS REGARDING ONSET, COURSE OF ILLNESS, INPATIENT/OUTPATIENT TREATMENT)
As per ED Behavioral Health Assessment on 6/14/2021: "Patient is a 19 y/o M, single, domiciled with parents and siblings, graduated college May 2021, PMH of chronic nausea and GERD, PPHx of Bipolar d/o, 3 prior inpt psychiatric hospitalizations, currently in treatment with Dr. Yepez, as per charr patient has hx of SA, h/o Cannabis use,  no known hx of violence or aggression, presents [place] ED BIB [place] after parents called 911 when patient was threatening to kill himself."
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
## 2021-06-15 NOTE — BH INPATIENT PSYCHIATRY ASSESSMENT NOTE - DESCRIPTION
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  Attending [employer] (walked with class during graduation, but completing coursework this summer).  Currently working in an internship.

## 2021-06-15 NOTE — BH INPATIENT PSYCHIATRY ASSESSMENT NOTE - CASE SUMMARY
Pt is 20 year old man, lives with parents and brother, finishing up college and in internship, with pphx of bipolar I disorder, borderline personality disorder, and prior [facility] admission.  Pt and family are consistent in sequence of events starting on 6/13 and leading to presentation to ER on 6/14 and subsequent psychiatric admission.  Series of interpersonal family disputes led to pt having emotional dysregulation, and making references to family friend who had recently completed suicide, and walking to train tracks.  Personal and professional collateral indicate there was no mood episode prior to these events and that this behavior is likely more consistent with provocative, dramatic, attention seeking acting out behavior due to borderline traits vs full personality disorder.  Despite this, clinical concern for safety does remain, given impulsivity and potential dangerousness of acting out behavior, even if in the absence of true overt suicidal ideation or intent.  This, as described above, may however represent more elevated chronic risk rather than acute/imminent, given pervasive and longstanding nature of working diagnosis (borderline PD).  Recommendations by outpatient team for more psychotherapeutic interventions are reflective of this as well, though it highlights the limitation of inpatient unit in mitigating chronic risk given that these interventions are more readily available through outpatient setting.  Anticipate that pt will have limited benefit from extended inpatient stay, and that risk of further emotional dysregulation may be elevated, given occupational stress that may result from this hospitalization.  Will continue to monitor pt and consider benefits/risks of further admission as appropriate.

## 2021-06-15 NOTE — BH INPATIENT PSYCHIATRY ASSESSMENT NOTE - PATIENT'S CHIEF COMPLAINT
"I got into an argument with my mother and said things I shouldn't have." "I don't need to be here."

## 2021-06-15 NOTE — BH INPATIENT PSYCHIATRY ASSESSMENT NOTE - NSSUICRSKFACTOR_PSY_ALL_CORE
Current and Past Psychiatric Diagnoses Current and Past Psychiatric Diagnoses/Presenting Symptoms/Historical Factors

## 2021-06-15 NOTE — BH INPATIENT PSYCHIATRY ASSESSMENT NOTE - DETAILS
abilify- stiffness father - BPD denies prior ideation or attempts Denies prior ideation or attempts Abilify- stiffness Father with bipolar?

## 2021-06-15 NOTE — BH INPATIENT PSYCHIATRY ASSESSMENT NOTE - MSE UNSTRUCTURED FT
20 year old male, appears current age, dressed in collar polo shirt and hospital pants, appropriate grooming and hygiene, appears tired after being awakened for interview, calm and cooperative with interview. No PMA/PMR. Speech is normal rate and volume, no latency, no increased productivity. Mood is "fine," affect is anxious. Thought process is linear, logical and goal directed. No flight of ideas. Thought content with focus on being discharged. Adamantly denies SI/HI. No delusional content. No perceptual disturbances. Attention and memory are intact. Insight and judgement are poor. Impulse control is intact at time of exam.

## 2021-06-16 VITALS — TEMPERATURE: 98 F

## 2021-06-16 LAB
A1C WITH ESTIMATED AVERAGE GLUCOSE RESULT: 5.5 % — SIGNIFICANT CHANGE UP (ref 4–5.6)
CHOLEST SERPL-MCNC: 165 MG/DL — SIGNIFICANT CHANGE UP
ESTIMATED AVERAGE GLUCOSE: 111 MG/DL — SIGNIFICANT CHANGE UP (ref 68–114)
HDLC SERPL-MCNC: 67 MG/DL — SIGNIFICANT CHANGE UP
LIPID PNL WITH DIRECT LDL SERPL: 86 MG/DL — SIGNIFICANT CHANGE UP
NON HDL CHOLESTEROL: 98 MG/DL — SIGNIFICANT CHANGE UP
TRIGL SERPL-MCNC: 59 MG/DL — SIGNIFICANT CHANGE UP

## 2021-06-16 PROCEDURE — 99239 HOSP IP/OBS DSCHRG MGMT >30: CPT

## 2021-06-16 RX ORDER — CARIPRAZINE 1.5 MG/1
1 CAPSULE, GELATIN COATED ORAL
Qty: 0 | Refills: 0 | DISCHARGE
Start: 2021-06-16

## 2021-06-16 RX ORDER — CLONAZEPAM 1 MG
1 TABLET ORAL
Qty: 0 | Refills: 0 | DISCHARGE
Start: 2021-06-16

## 2021-06-16 RX ORDER — FAMOTIDINE 10 MG/ML
1 INJECTION INTRAVENOUS
Qty: 0 | Refills: 0 | DISCHARGE
Start: 2021-06-16

## 2021-06-16 RX ADMIN — FAMOTIDINE 20 MILLIGRAM(S): 10 INJECTION INTRAVENOUS at 09:35

## 2021-06-16 RX ADMIN — Medication 2 MILLIGRAM(S): at 09:35

## 2021-06-16 RX ADMIN — Medication 2 MILLIGRAM(S): at 12:20

## 2021-06-16 NOTE — BH INPATIENT PSYCHIATRY DISCHARGE NOTE - NSBHSUICIDESTATUS_PSY_ALL_CORE
Risk assessment on day of discharge:    Patient has chronic risk factors of h/o bipolar disorder, h/o cluster B personality traits, h/o past psychiatric hospitalizations, documented history of SA in 2015, male gender. Acute risk factors were emotional dysregulation and poor judgment/impulsivity with acting out behaviors that included dramatic provocative suicidal content, now stabilized with inpatient admission. Protective factors include supportive family, stable housing, stable finances, lack of access to firearms, active therapeutic relationships with outpatient psychiatrist and therapist, and no active penny or depression. Given the above, the patient has elevated CHRONIC risk of harm to self, but this risk is no longer acutely elevated, and will NOT be further mitigated by inpatient admission. Patient is stable for discharge with close outpatient follow up, which has been arranged by treatment team

## 2021-06-16 NOTE — BH DISCHARGE NOTE NURSING/SOCIAL WORK/PSYCH REHAB - DISCHARGE INSTRUCTIONS AFTERCARE APPOINTMENTS
In order to check the location, date, or time of your aftercare appointment, please refer to your Discharge Instructions Document given to you upon leaving the hospital.  If you have lost the instructions please call [phone]

## 2021-06-16 NOTE — BH DISCHARGE NOTE NURSING/SOCIAL WORK/PSYCH REHAB - NSDCPRRECOMMEND_PSY_ALL_CORE
Patient was provided with and completed a Patient Safety Template with psych rehab staff prior to discharge. Patient was also given a Press Ganey survey.      Psychiatric rehabilitation staff recommends that patient return to outpatient treatment for ongoing medication management, support and psychotherapy.

## 2021-06-16 NOTE — BH INPATIENT PSYCHIATRY DISCHARGE NOTE - DESCRIPTION
Lives with parents and brother, sister is away at college.  Attending [employer] (walked with class during graduation, but completing coursework this summer).  Currently working in an internship.

## 2021-06-16 NOTE — BH INPATIENT PSYCHIATRY PROGRESS NOTE - NSBHCHARTREVIEWVS_PSY_A_CORE FT
Vital Signs Last 24 Hrs  T(C): 36.7 (16 Jun 2021 07:21), Max: 36.9 (15 Gamaliel 2021 18:22)  T(F): 98.1 (16 Jun 2021 07:21), Max: 98.4 (15 Gamaliel 2021 18:22)  HR: --  BP: --  BP(mean): --  RR: --  SpO2: --

## 2021-06-16 NOTE — BH INPATIENT PSYCHIATRY DISCHARGE NOTE - MODIFICATIONS
Patient seen individually for discharge day management. Greater than 30 minutes was spent with patient, staff and charting as part of discharge day management. The patient's case has been reviewed with the treatment team.  I was physically present during the service to the patient and personally examined the patient and I was directly involved in the management plan and recommendations of the care provided to the patient.  I have reviewed the resident's progress note and agree with its contents and I have made changes as indicated

## 2021-06-16 NOTE — BH INPATIENT PSYCHIATRY DISCHARGE NOTE - HOSPITAL COURSE
Upon initial evaluation on the unit, narrative of events per patient and collateral matched what was gathered in ED. Additional collateral from patient's therapist Dr. Macro A Wright revealed that she had met with patient virtually about an hour before EMS was called by family, and that according to her risk assessment patient was not suicidal at that time, and rather was expressing Upon initial evaluation on the unit, narrative of events per patient and collateral matched what was gathered in ED. History and exam did not show depressive manic symptoms consistent with an active mood episode. 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 Additional collateral from patient's therapist Dr. Marco A Wright revealed that she had met with patient virtually about an hour before EMS was called by family, and that according to her risk assessment patient was not suicidal at that time, and his vague statements like "I can't do this anymore" were a result of feeling upset and frustrated. Diagnostic impression was emotional dysregulation from borderline traits in the setting of activating stressor of arguing with parents. Collateral from outpatient psychiatrist Dr. Yepez expressed concern for possible mood episode, though ultimately was in agreement with impression of treatment team, and also encouraged further observation on the unit prior to discharge. Upon further observation on unit, he remained without symptoms of depression or penny, was sleeping adequately, was consistently denies SI/HI, and was attending to his ADL's independently and consistently. This further suggested that patient's suicidal statements were provocative, attention-seeking statements related to chronic borderline traits vs full disorder and ongoing chronic issues with family dynamics, and not a result of active suicidal ideation resulting from immediately modifiable psychiatric illness like penny or depressive episode. The treatment team felt that the patient will benefit more from outpatient treatment of these chronic issues, which are not immediately modifiable in the inpatient setting. He was discharged with close outpatient follow up with this therapist Dr. Wright and psychiatrist Dr. Yepez.

## 2021-06-16 NOTE — BH INPATIENT PSYCHIATRY DISCHARGE NOTE - CASE SUMMARY
On exam today the patient is generally cooperative and makes fair eye contact.   Speech is clear and of normal rate.  Thought process: with no disorder of thought process.   Thought content: with no evidence of delusional beliefs.   Perception: Denies hallucinations.  Mood: Describes as "improved"   Affect: flat.  Patient denies suicidal and aggressive ideation, intent and plan.   AAO X3. Cognitively grossly intact.   Insight and judgment are improved.  Impulse control is intact at this time     Suicide and risk assessment performed prior to discharge. The patient has a low acute risk and low chronic risk of self-harm. Protective factors include denying SI, no SIB, good social supports in their family, no substance abuse, no current mood symptoms, no hopelessness, future-oriented in returning to home, no access to firearms.  Risk factors include presenting illness. Immediate risk was minimized by inpatient admission to a safe environment with appropriate supervision and limited access to lethal means. Future risk was minimized before discharge by treatment of acute episode, maximizing outpatient support, providing relevant patient education, discussing emergency procedures, and ensuring close follow-up. The patient remains at a low risk of self-harm, and such risk cannot be further ameliorated by continued inpatient treatment and the patient is therefore appropriate for discharge.      There were no behavioral problems on the unit.  Patient did not become agitated and did not require emergent intramuscular medications or seclusion / restraints.  Patient did not self-harm on the unit.  Patient remained actively engaged in treatment.  Patient participated in individual, group, and milieu therapy.  Patient got along appropriately with staff and peers.   Patient did not have any medical problems during this hospitalization.  There were no medical consultations.  A full discussion of the factors that predict treatment success and relapse was held including safety planning.  A discussion of the risks and benefits of patient’s medication was held including a discussion of the metabolic risks and risk of EPS and TD was done     The patient has improved significantly and no longer requires inpatient treatment and care. Patient denies all suicidal and aggressive ideation, intent and plan. Patient denies anxiety symptoms and panic attacks. Patient is not judged to be an acute danger to self or others at this time. Patient will be discharged today to home and outpatient follow up.     21 y/o M, single, domiciled with parents and siblings, graduated college May 2021, PMH of chronic nausea, PPHx of Bipolar d/o, 3 prior inpt psychiatric hospitalizations, currently in treatment with Dr. Yepez, as per annette patient has hx of SA, h/o Cannabis use, no known hx of violence or aggression, presents to ED BIB EMS/[employer] after parents called 911 when patient was threatening to kill himself.     Patient has done well on the unit with most of his symptoms consistent with affective instability and cluster B issues in addition to his hx of mood disorder  course of treatment as above    Family and outpt team comfortable with patient returning home and to outpt treatment         On exam today the patient is generally cooperative and makes fair eye contact.   Speech is clear and of normal rate.  Thought process: with no disorder of thought process.   Thought content: with no evidence of delusional beliefs.   Perception: Denies hallucinations.  Mood: Describes as "improved"   Affect: flat.  Patient denies suicidal and aggressive ideation, intent and plan.   AAO X3. Cognitively grossly intact.   Insight and judgment are improved.  Impulse control is intact at this time     Suicide and risk assessment performed prior to discharge. The patient has a low acute risk and low chronic risk of self-harm. Protective factors include denying SI, no SIB, good social supports in their family, no substance abuse, no current mood symptoms, no hopelessness, future-oriented in returning to home, no access to firearms.  Risk factors include presenting illness. Immediate risk was minimized by inpatient admission to a safe environment with appropriate supervision and limited access to lethal means. Future risk was minimized before discharge by treatment of acute episode, maximizing outpatient support, providing relevant patient education, discussing emergency procedures, and ensuring close follow-up. The patient remains at a low risk of self-harm, and such risk cannot be further ameliorated by continued inpatient treatment and the patient is therefore appropriate for discharge.      There were no behavioral problems on the unit.  Patient did not become agitated and did not require emergent intramuscular medications or seclusion / restraints.  Patient did not self-harm on the unit.  Patient remained actively engaged in treatment.  Patient participated in individual, group, and milieu therapy.  Patient got along appropriately with staff and peers.   Patient did not have any medical problems during this hospitalization.  There were no medical consultations.  A full discussion of the factors that predict treatment success and relapse was held including safety planning.  A discussion of the risks and benefits of patient’s medication was held  The patient has improved significantly and no longer requires inpatient treatment and care. Patient denies all suicidal and aggressive ideation, intent and plan. Patient denies anxiety symptoms and panic attacks. Patient is not judged to be an acute danger to self or others at this time. Patient will be discharged today to home and outpatient follow up.

## 2021-06-16 NOTE — BH INPATIENT PSYCHIATRY DISCHARGE NOTE - NSDCCPCAREPLAN_GEN_ALL_CORE_FT
PRINCIPAL DISCHARGE DIAGNOSIS  Diagnosis: Bipolar disorder  Assessment and Plan of Treatment:       SECONDARY DISCHARGE DIAGNOSES  Diagnosis: Cluster B personality disorder in adult  Assessment and Plan of Treatment:

## 2021-06-16 NOTE — BH DISCHARGE NOTE NURSING/SOCIAL WORK/PSYCH REHAB - PATIENT PORTAL LINK FT
You can access the FollowMyHealth Patient Portal offered by [facility] by registering at the following website: [email or web address]. By joining [employer]’s FollowMyHealth portal, you will also be able to view your health information using other applications (apps) compatible with our system.

## 2021-06-16 NOTE — BH INPATIENT PSYCHIATRY PROGRESS NOTE - TELEPSYCHIATRY?
From: Jody Delcid  To: Meri Medina No

## 2021-06-16 NOTE — BH INPATIENT PSYCHIATRY PROGRESS NOTE - PRN MEDS
MEDICATIONS  (PRN):  OLANZapine 5 milliGRAM(s) Oral every 6 hours PRN agitation  OLANZapine Injectable 10 milliGRAM(s) IntraMuscular every 8 hours PRN severe agitation  ondansetron    Tablet 4 milliGRAM(s) Oral every 8 hours PRN Nausea

## 2021-06-16 NOTE — BH INPATIENT PSYCHIATRY DISCHARGE NOTE - OTHER PAST PSYCHIATRIC HISTORY (INCLUDE DETAILS REGARDING ONSET, COURSE OF ILLNESS, INPATIENT/OUTPATIENT TREATMENT)
Diagnoses of bipolar I disorder, PTSD, borderline traits vs full personality disorder.  Psychiatrist Dr. Yepez and Psychotherapist Dr. Wright.    Prior hospitalization at [facility] in 5/2020.

## 2021-06-16 NOTE — BH INPATIENT PSYCHIATRY PROGRESS NOTE - CURRENT MEDICATION
MEDICATIONS  (STANDING):  cariprazine 3 milliGRAM(s) Oral <User Schedule>  clonazePAM  Tablet 2 milliGRAM(s) Oral three times a day  famotidine    Tablet 20 milliGRAM(s) Oral daily  QUEtiapine 200 milliGRAM(s) Oral at bedtime    MEDICATIONS  (PRN):  OLANZapine 5 milliGRAM(s) Oral every 6 hours PRN agitation  OLANZapine Injectable 10 milliGRAM(s) IntraMuscular every 8 hours PRN severe agitation  ondansetron    Tablet 4 milliGRAM(s) Oral every 8 hours PRN Nausea

## 2021-06-16 NOTE — BH INPATIENT PSYCHIATRY PROGRESS NOTE - NSBHASSESSSUMMFT_PSY_ALL_CORE
Patient is a 21 y/o M, with historical diagnosis of bipolar disorder, documented history of past SA in 2015, h/o past hospitalizations, and h/o borderline traits and relational difficulties with patients leading to emotional dysregulation, who is admitted after being brought in by EMS activated by brother for vague suicidal statements.     Patient's presentation is most consistent with emotional dysregulation from borderline traits in the setting of activating stressor of arguing with parents, which for the patient is a chronic issue. On exam, subjective report, and per collateral, patient had been doing in the weeks leading to admission, without evidence of manic or depressive episode. His emotional which is a chronic issue for the patient. The patient has maintained that he not had any suicidal intent or plan, that he never had did go on the train tracks and never had any intent of doing so, and that his vague suicidal statements have been an attempt to get attention from his family, which he now regrets. His father is in agreement with this sentiment. His therapist who evaluated him just prior to EMS being called also felt that the patient was not suicidal. His psychiatrist is in agreement with diagnosis, but is still suggesting more observation on the unit. As such, while patient's suicidal statements appear to be related to chronic borderline traits which will benefit more from outpatient treatment, and not a result of immediately modifiable psychiatric illness like penny or depressive episode, he will be further observed on the unit.     - admit on 9.39  - safety: Q15 checks  - standing meds: continue Vrylar 3 mg qHS, Seroquel 200 mg qHS Klonopin 2 mg TID, and Wellbutrin  mg daily  Patient is a 21 y/o M, with historical diagnosis of bipolar disorder, documented history of past SA in 2015, h/o past hospitalizations, and h/o borderline traits and relational difficulties with patients leading to emotional dysregulation, who is admitted after being brought in by EMS activated by brother for vague suicidal statements.     Patient's presentation continues to be most consistent with emotional dysregulation from borderline traits in the setting of activating stressor of arguing with parents. Upon further observation on unit, he remains without symptoms of depression or penny, is sleeping adequately, he consistently denies SI/HI, and is attending to his ADL's independently and consistently. This further suggests that patient's suicidal statements are provocative, attention-seeking statements related to chronic borderline traits vs full disorder and ongoing chronic issues with family dynamics, and not a result of active suicidal ideation resulting from immediately modifiable psychiatric illness like penny or depressive episode. This is also supported by personal and professional collateral. At this point, the  will benefit more from outpatient treatment of these chronic issues, which are not immediately modifiable in this inpatient setting.     Risk assessment:     Patient has chronic risk factors of h/o bipolar disorder, h/o cluster B personality traits, h/o past psychiatric hospitalizations, documented history of SA in 2015, male gender. Acute risk factors were emotional dysregulation and poor judgment/impulsivity with acting out behaviors that included dramatic provocative suicidal content, now stabilized with inpatient admission. Protective factors include supportive family, stable housing, stable finances, lack of access to firearms, active therapeutic relationships with outpatient psychiatrist and therapist, and no active penny or depression. Given the above, the patient has elevated CHRONIC risk of harm to self, but this risk is no longer acutely elevated, and will NOT be further mitigated by inpatient admission. Patient is stable for discharge with close outpatient follow up, which has been arranged by treatment team.     Discharge medications: continue Vrylar 3 mg qHS, Seroquel 200 mg qHS Klonopin 2 mg TID, and Wellbutrin  mg daily  Patient is a 21 y/o M, with historical diagnosis of bipolar disorder, documented history of past SA in 2015, h/o past hospitalizations, and h/o borderline traits and relational difficulties with patients leading to emotional dysregulation, who is admitted after being brought in by EMS activated by brother for vague suicidal statements.     Patient's presentation continues to be most consistent with emotional dysregulation from borderline traits in the setting of activating stressor of arguing with parents. Upon further observation on unit, he remains without symptoms of depression or penny, is sleeping adequately, he consistently denies SI/HI, and is attending to his ADL's independently and consistently. This further suggests that patient's suicidal statements are provocative, attention-seeking statements related to chronic borderline traits vs full disorder and ongoing chronic issues with family dynamics, and not a result of active suicidal ideation resulting from immediately modifiable psychiatric illness like penny or depressive episode. This is also supported by personal and professional collateral. At this point, the  will benefit more from outpatient treatment of these chronic issues, which are not immediately modifiable in this inpatient setting.     Risk assessment:     Patient has chronic risk factors of h/o bipolar disorder, h/o cluster B personality traits, h/o past psychiatric hospitalizations, documented history of SA in 2015, male gender. Acute risk factors were emotional dysregulation and poor judgment/impulsivity with acting out behaviors that included dramatic provocative suicidal content, now stabilized with inpatient admission. Protective factors include supportive family, stable housing, stable finances, lack of access to firearms, active therapeutic relationships with outpatient psychiatrist and therapist, and no active penny or depression. Given the above, the patient has elevated CHRONIC risk of harm to self, but this risk is no longer acutely elevated, and will NOT be further mitigated by inpatient admission. Patient is stable for discharge with close outpatient follow up, which has been arranged by treatment team.     Discharge medications: continue Vrylar 3 mg qHS, Seroquel 200 mg qHS Klonopin 2 mg TID, and Wellbutrin  mg daily     Patient says that he already has enough medications at home, and does not need to be discharged with meds.

## 2021-06-16 NOTE — BH INPATIENT PSYCHIATRY PROGRESS NOTE - NSBHFUPINTERVALHXFT_PSY_A_CORE
Chart reviewed. Case d/w interdisciplinary team. No acute events overnight, no PRN's required or requested. Per sleep log, patient slept throughout night. Seen for f/u of suicidal statements. Patients continues to adamantly deny SI/HI. He says that he did reflective thinking last night and going forward intends to stop making suicidal statements in order to get attention from his family when is he upset with them.  Chart reviewed. Case d/w interdisciplinary team. No acute events overnight, no PRN's required or requested. Per sleep log, patient slept throughout night. Has maintained behavioral control on unit. Seen for f/u of suicidal statements. Patients continues to adamantly deny SI/HI. He says that he did reflective thinking last night and going forward intends to stop making suicidal statements in order to get attention from his family when is he upset with them. Regarding Sunday 6/13 when patient walked to the train station, he reports that most of the time walking to the station he was on the phone with his parents, after getting there and sitting down on a bench asked them to come pick him up. Denies having any suicidal intent or plan at that time. 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  Chart reviewed. Case d/w interdisciplinary team. No acute events overnight, no PRN's required or requested. Per sleep log, patient slept throughout night. Has maintained behavioral control on unit. Seen for f/u of suicidal statements. Patients continues to adamantly deny SI/HI. He says that he did reflective thinking last night and going forward intends to stop making suicidal statements in order to get attention from his family when is he upset with them. Regarding Sunday 6/13 when patient walked to the train station, he reports that most of the time walking to the station he was on the phone with his parents, after getting there and sitting down on a bench asked them to come pick him up. Denies having any suicidal intent or plan at that time. 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     Spoke again with father Ti Carrera. Regarding his son he says "I'm 100% certain he's not suicidal," and advocates for patient being discharged. Says that he visited patient last night, and that he felt he was doing well, without manic or depressive symptoms. Said that patient was apologizing for what happened and stating his intention to act more maturely.

## 2021-06-16 NOTE — BH INPATIENT PSYCHIATRY PROGRESS NOTE - NSBHCONSBHPROVDETAILS_PSY_A_CORE  FT
As per above, psychiatrist Dr. Yepez and Therapist Dr. Wright Spoke with psychiatrist Dr. Yepez and Therapist Dr. Wright

## 2021-06-16 NOTE — BH DISCHARGE NOTE NURSING/SOCIAL WORK/PSYCH REHAB - NSBHREFERPURPOSE1_PSY_ALL_CORE
Mental Health Treatment ____________________________________________________________________________________________/Mental Health Treatment

## 2021-06-16 NOTE — BH DISCHARGE NOTE NURSING/SOCIAL WORK/PSYCH REHAB - NSCDUDCCRISIS_PSY_A_CORE
[facility] Well  1 [phone]) [facility]-WELL ([phone])  Text "WELL" to 52565  Website: www.[employer]In[employer]/.[employer] 1 (663) 541-TOFW (3628) Website: www.safehorizon.org/.National Suicide Prevention Lifeline 4 (905) 194-9676/.  [employer]  1 (745) LIFENET (011-6300)/.  [facility]  [address] [place] [id number] [phone]   Hours:  Monday through Friday from 9 AM to 3 PM/.  [employer] - [facility] Crisis Line  1 [phone], Option 1

## 2021-06-16 NOTE — BH INPATIENT PSYCHIATRY PROGRESS NOTE - MSE UNSTRUCTURED FT
HPI:   Michelle Gould, (has different prior MRN [id number],  1957). Transferred from [facility] as Edilma Richardson ( 1957).    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 Admitted after she was reportedly found down and confused - by mother who called 911. No family members at the bedside, history obtained from med records and ED staff. At [place] [facility], per report, CSF showed , , glucose 98, protein 240. Febrile to 100.6, tachy to 156, 98% RA, 158/88.  Reportedly seized x3 at OSH at was given Ativan.  Also received Vanco 1 gr + Ceftriaxone 2 gr IV once at [facility].   CSF and BCx, and UCx sent at [facility], confirmed with their lab - tel. [phone]  On arrival to [facility] patient was GCS 9.    Overnight Events: possible CSF leak noted overnight from the healing surgical wound.    ROS: negative [x] unable to obtain as patient is comatose/intubated/aphasic []   VITALS:   T(C): 37 (18 @ 03:00), Max: 37.1 (18 @ 11:00)  HR: 67 (18 @ 07:00) (62 - 98)  BP: 150/86 (18 @ 06:00) (100/84 - 165/93)  RR: 21 (18 @ 07:00) (10 - 23)  SpO2: 99% (18 @ 07:00) (95% - 100%)    18 @ 07:01  -  18 @ 07:00  --------------------------------------------------------  IN: 2050 mL / OUT: 1100 mL / NET: 950 mL      LABS:                          11.8   9.4   )-----------( 272      ( 2018 01:31 )             34.0         135  |  100  |  11  ----------------------------<  108<H>  3.5   |  22  |  0.82    Ca    8.8      2018 01:31  Phos  2.0     -  Mg     2.2     -    TPro  6.0  /  Alb  3.4  /  TBili  2.1<H>  /  DBili  0.4<H>  /  AST  32  /  ALT  44  /  AlkPhos  66  -17    PT/INR - ( 2018 15:12 )   PT: 11.0 sec;   INR: 1.02 ratio         PTT - ( 2018 15:12 )  PTT:24.6 sec  MEDS:  MEDICATIONS  (STANDING):  cefepime  IVPB      cefepime  IVPB 2000 milliGRAM(s) IV Intermittent every 8 hours  enoxaparin Injectable 40 milliGRAM(s) SubCutaneous <User Schedule>  levETIRAcetam 1000 milliGRAM(s) Oral two times a day  metoprolol tartrate 25 milliGRAM(s) Oral two times a day  morphine  - Injectable 2 milliGRAM(s) IV Push once  nystatin Cream 1 Application(s) Topical two times a day  thiamine 300 milliGRAM(s) Oral daily  vancomycin  IVPB 1000 milliGRAM(s) IV Intermittent every 12 hours      [All pertinent recent Imaging/Reports reviewed]    DEVICES: [] Restraints [] MICHELLE/HMV []LD [] ET tube [] Trach [] A-line [] Schultz [] NGT [] Rectal Tube       EXAMINATION:  Neurologic Examination: Alert, roiented x2, verbal, ?right CN6 palsy and Right facial droop, FC, LONG spontaneously.  Chest CTAB.  S1S2 present.   lungs good breath sounds bilaterally   Abd: soft, nontender  ext: no edema 20 year old male, appears current age, dressed in collar polo shirt and hospital pants, appropriate grooming and hygiene, appears tired after being awakened for interview, calm and cooperative with interview. No PMA/PMR. Speech is normal rate and volume, no latency, no increased productivity. Mood is "fine," affect is anxious. Thought process is linear, logical and goal directed. No flight of ideas. Thought content with focus on being discharged. Adamantly denies SI/HI. No delusional content. No perceptual disturbances. Attention and memory are intact. Insight and judgement are poor. Impulse control is intact at time of exam.  20 year old male, appears current age, dressed in casual clothes appropriate grooming and hygiene, is frustrated with continued admission and repetitive questions, but overall calm and cooperative with interview. No PMA/PMR. Speech continues to have a normal rate and volume, no latency, no increased productivity. Mood is "fine," affect is frustrated. Thought process continues to be linear, logical and goal directed. No flight of ideas. Thought content with focus on being discharged and avoiding future hospitalizations. Continues to deny SI/HI. No delusional content. No perceptual disturbances. Attention and memory are intact. Insight and judgement are poor. Impulse control is intact at time of exam.

## 2021-06-16 NOTE — BH INPATIENT PSYCHIATRY PROGRESS NOTE - MODIFICATIONS
Modifications were made to above trainee note where appropriate and/or are addressed below in case summary.  Patient seen individually for discharge day management. Greater than 30 minutes was spent with patient, staff and charting as part of discharge day management. The patient's case has been reviewed with the treatment team.  I was physically present during the service to the patient and personally examined the patient and I was directly involved in the management plan and recommendations of the care provided to the patient.  I have reviewed the resident's progress note and agree with its contents and I have made changes as indicated

## 2021-06-16 NOTE — BH INPATIENT PSYCHIATRY PROGRESS NOTE - CASE SUMMARY
Pt is 20 year old man, lives with parents and brother, finishing up college and in internship, with pphx of bipolar I disorder, borderline personality disorder, and prior [facility] admission.  Pt and family are consistent in sequence of events starting on 6/13 and leading to presentation to ER on 6/14 and subsequent psychiatric admission.  Series of interpersonal family disputes led to pt having emotional dysregulation, and making references to family friend who had recently completed suicide, and walking to train tracks.  Personal and professional collateral indicate there was no mood episode prior to these events and that this behavior is likely more consistent with provocative, dramatic, attention seeking acting out behavior due to borderline traits vs full personality disorder.  Despite this, clinical concern for safety does remain, given impulsivity and potential dangerousness of acting out behavior, even if in the absence of true overt suicidal ideation or intent.  This, as described above, may however represent more elevated chronic risk rather than acute/imminent, given pervasive and longstanding nature of working diagnosis (borderline PD).  Recommendations by outpatient team for more psychotherapeutic interventions are reflective of this as well, though it highlights the limitation of inpatient unit in mitigating chronic risk given that these interventions are more readily available through outpatient setting.  Anticipate that pt will have limited benefit from extended inpatient stay, and that risk of further emotional dysregulation may be elevated, given occupational stress that may result from this hospitalization.  Will continue to monitor pt and consider benefits/risks of further admission as appropriate. 19 y/o M, with historical diagnosis of bipolar disorder, documented history of past SA in 2015, h/o past hospitalizations, and h/o borderline traits and relational difficulties with patients leading to emotional dysregulation, who is admitted after being brought in by EMS activated by brother for vague suicidal statements.     On exam today the patient is generally cooperative and makes fair eye contact.   Speech is clear and of normal rate.  Thought process: with no disorder of thought process.   Thought content: with no evidence of delusional beliefs.   Perception: Denies hallucinations.  Mood: Describes as "improved"   Affect: flat.  Patient denies suicidal and aggressive ideation, intent and plan.   AAO X3. Cognitively grossly intact.   Insight and judgment are improved.  Impulse control is intact at this time     Suicide and risk assessment performed prior to discharge. The patient has a low acute risk and low chronic risk of self-harm. Protective factors include denying SI, no SIB, good social supports in their family, no substance abuse, no current mood symptoms, no hopelessness, future-oriented in returning to home, no access to firearms.  Risk factors include presenting illness. Immediate risk was minimized by inpatient admission to a safe environment with appropriate supervision and limited access to lethal means. Future risk was minimized before discharge by treatment of acute episode, maximizing outpatient support, providing relevant patient education, discussing emergency procedures, and ensuring close follow-up. The patient remains at a low risk of self-harm, and such risk cannot be further ameliorated by continued inpatient treatment and the patient is therefore appropriate for discharge.      There were no behavioral problems on the unit.  Patient did not become agitated and did not require emergent intramuscular medications or seclusion / restraints.  Patient did not self-harm on the unit.  Patient remained actively engaged in treatment.  Patient participated in individual, group, and milieu therapy.  Patient got along appropriately with staff and peers.   Patient did not have any medical problems during this hospitalization.  There were no medical consultations.  A full discussion of the factors that predict treatment success and relapse was held including safety planning.  A discussion of the risks and benefits of patient’s medication was held including a discussion of the metabolic risks and risk of EPS and TD was done     The patient has improved significantly and no longer requires inpatient treatment and care. Patient denies all suicidal and aggressive ideation, intent and plan. Patient denies anxiety symptoms and panic attacks. Patient is not judged to be an acute danger to self or others at this time. Patient will be discharged today to home and outpatient follow up.     19 y/o M, with historical diagnosis of bipolar disorder, documented history of past SA in 2015, h/o past hospitalizations, and h/o borderline traits and relational difficulties with patients leading to emotional dysregulation, who is admitted after being brought in by EMS activated by brother for vague suicidal statements.     On exam today the patient is generally cooperative and makes fair eye contact.   Speech is clear and of normal rate.  Thought process: with no disorder of thought process.   Thought content: with no evidence of delusional beliefs.  Perception: Denies hallucinations.  Mood: Describes as "improved"   Affect: flat.  Patient denies suicidal and aggressive ideation, intent and plan.   AAO X3. Cognitively grossly intact.   Insight and judgment are improved.  Impulse control is intact at this time     Suicide and risk assessment performed prior to discharge. The patient has a low acute risk and low chronic risk of self-harm. Protective factors include denying SI, no SIB, good social supports in their family, no substance abuse, no current mood symptoms, no hopelessness, future-oriented in returning to home, no access to firearms.  Risk factors include presenting illness. Immediate risk was minimized by inpatient admission to a safe environment with appropriate supervision and limited access to lethal means. Future risk was minimized before discharge by treatment of acute episode, maximizing outpatient support, providing relevant patient education, discussing emergency procedures, and ensuring close follow-up. The patient remains at a low risk of self-harm, and such risk cannot be further ameliorated by continued inpatient treatment and the patient is therefore appropriate for discharge.      There were no behavioral problems on the unit.  Patient did not become agitated and did not require emergent intramuscular medications or seclusion / restraints.  Patient did not self-harm on the unit.  Patient remained actively engaged in treatment.  Patient participated in individual, group, and milieu therapy.  Patient got along appropriately with staff and peers.   Patient did not have any medical problems during this hospitalization.  There were no medical consultations.  A full discussion of the factors that predict treatment success and relapse was held including safety planning.  A discussion of the risks and benefits of patient’s medication was held including a discussion of the metabolic risks and risk of EPS and TD was done     The patient has improved significantly and no longer requires inpatient treatment and care. Patient denies all suicidal and aggressive ideation, intent and plan. Patient denies anxiety symptoms and panic attacks. Patient is not judged to be an acute danger to self or others at this time. Patient will be discharged today to home and outpatient follow up.

## 2021-06-16 NOTE — BH DISCHARGE NOTE NURSING/SOCIAL WORK/PSYCH REHAB - NSDCPRGOAL_PSY_ALL_CORE
During the current hospitalization, patient has been working on psychiatric rehabilitation goals pertaining to identifying 2 coping skills. Patient has made good progress. Pt reported coping skills of watching TV and distracting himself. Patient is currently assessed with a linear thought process with fair insight and judgment. Patient has been observed behaviorally appropriate in the environment. Pt has been minimally visible in the environment and is mostly observed in his room.     Pt was assessed with a slightly irritable mood and a congruent affect. Pt reported motivation for discharge and that him and his father “both agree” that it would be “better” for him to go home today. Patient has reported daily medication compliance. Patient denied suicidal and homicidal ideation/intent/plan. Pt denied auditory and visual hallucinations. 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

## 2021-06-16 NOTE — BH INPATIENT PSYCHIATRY DISCHARGE NOTE - NSDCMRMEDTOKEN_GEN_ALL_CORE_FT
cariprazine 3 mg oral capsule: 1 cap(s) orally   clonazePAM 2 mg oral tablet: 1 tab(s) orally 3 times a day  famotidine 20 mg oral tablet: 1 tab(s) orally once a day  QUEtiapine 200 mg oral tablet: 1 tab(s) orally once a day (at bedtime)

## 2021-06-16 NOTE — BH INPATIENT PSYCHIATRY PROGRESS NOTE - NSBHCHARTREVIEWLAB_PSY_A_CORE FT
14.2   9.99  )-----------( 313      ( 2021 19:22 )             43.4       06-14    141  |  102  |  17  ----------------------------<  91  4.0   |  25  |  1.12    Ca    9.7      2021 19:22    TPro  7.3  /  Alb  5.1<H>  /  TBili  0.5  /  DBili  x   /  AST  16  /  ALT  13  /  AlkPhos  68  06-14              Urinalysis Basic - ( 2021 19:22 )    Color: Yellow / Appearance: Clear / S.035 / pH: x  Gluc: x / Ketone: Small  / Bili: Negative / Urobili: <2 mg/dL   Blood: x / Protein: 30 mg/dL / Nitrite: Negative   Leuk Esterase: Negative / RBC: 1 /HPF / WBC 1 /HPF   Sq Epi: x / Non Sq Epi: 1 /HPF / Bacteria: Negative            Lactate Trend            CAPILLARY BLOOD GLUCOSE

## 2021-06-16 NOTE — BH INPATIENT PSYCHIATRY DISCHARGE NOTE - HPI (INCLUDE ILLNESS QUALITY, SEVERITY, DURATION, TIMING, CONTEXT, MODIFYING FACTORS, ASSOCIATED SIGNS AND SYMPTOMS)
Adapted from ED assessment:    Patient is a 21 y/o M, single, domiciled with parents and siblings, graduated college May 2021, PMH of chronic nausea, PPHx of Bipolar d/o, 3 prior inpt psychiatric hospitalizations, currently in treatment with Dr. Yepez, as per charr patient has hx of SA, h/o Cannabis use, no known hx of violence or aggression, presents to ED BIB EMS/[employer] after parents called 911 when patient was threatening to kill himself.     Patient reported he was out to lunch with his mother yesterday and he became angry when their conversation was interrupted by a phone call from his sister. Patient felt his mother should has devoted her full attention to him and should have quickly ended the conversation with his sister. After leaving the restaurant patient claims he returned home and again became angry when he attempted to speak to his father about the event and his father walked away. Patient then left the home to  a baseball game. 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 While at the office the police arrived and was taken to [facility]. Patient reports his mother called 911 because he made a statement yesterday () " I can't do this anymore." Patient denies this was a suicide statement and denies current or recent suicidal ideation. Patient reported PPH of bipolar disorder and is in current treatment with Dr. Yepez and attends psychotherapy weekly. He reported his mood has been " okay." and had been enjoying going out with his friends. Patient reported he is sleeping 6-8 hours/night, denied abnormal amounts of energy, risky behavior, racing thoughts or rapid pressured speech. Patient also denied any recent or past auditory/visual hallucinations, thoughts of paranoia or ideas of reference. Patient reported his family is under a lot of stress lately as his grandmother is dying, father is physically ill, and brothers good friend  by suicide.     ED received collateral from mother Jeanne Deandre: "reports patient became agitated at lunch after sister called and interrupted their lunch.  When patient returned home he took the car and was texting suicidal statements while driving the car. Patient appeared manic and aggressive. He eventually returned home with intermittent outburst and verbal threats to commit suicide. Patient continued to threaten suicide intermittently throughout the evening and again this morning. Patient left for work this morning and sent his brother text threatening suicide.  Patient has h/o bipolar disorder and appears to be compliant with his medications. He often becomes irritable when he feels he is not getting enough attention. Most recently he appeared to be doing well. Patient displayed similar behavior in May 2020 prior to be hospitalized at [facility]."    ED received additional collateral from father Ti Carrera who confirmed above collateral. "Last evening patient again threatened suicide and walked to the nearby train station. He then called his parents and asked for them to pick him up. After patient returned home from the train station he left the house and went out with a friend. When he return home around 11 pm he was agitated and yelling at parents. Mr Carrera told patient he could not engage in this behavior and went to sleep. Patient called and text his parents throughout the night. Several text were suicide statements.  This morning patient appeared depressed and irritable. 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 Patient's brother called 911. Father is afraid patient will attempt suicide  and agrees with hospitalization"

## 2021-08-05 ENCOUNTER — TRANSCRIPTION ENCOUNTER (OUTPATIENT)
Age: 21
End: 2021-08-05

## 2021-09-27 NOTE — BH PATIENT PROFILE - NSPROCHRONICPAIN_GEN_A_NUR
30yo  @ 30w4d PNC IVF, gDMA2 p/w c/o weakness. Pt reports feeling heaviness in legs and having sharp pain at her tailbone, worse when sitting up/improved with laying down. She was brought in by ambulance after feeling weak and w/"tingling" in legs during fire drill at school. She had checked her fasting FS was 88 and post-bfast was 100. Prior to presentation in triage, her FS was 72. Denies any fall/trauma, CP, SOB, HA, fevers/chills. +FM. Denies LOF, VB, CTX.    PNC: [place]. gDMA2. No reported genetic/sono abnl. EFW 2wk ago 80%    OBHx:  G1 PPROM @ 35w s/p   G2 current    GYNHx: h/o PCOS; denies STIs, abnl Pap smears, fibroids    PMH: scoliosis  PSH: eye surgery @6yo    Meds: Novolog 0/3/0, Humulin N 3u  All: NKDA, cranberries (hives)    Soc: denies T/E/D  Psych: anxiety (no meds, no dr)    Will accept blood products.
no

## 2022-03-12 NOTE — ED BEHAVIORAL HEALTH ASSESSMENT NOTE - MARITAL STATUS
pt with superficial laceration from dog bite. pt requests plastics eval. advised on probably extra charge. will update tetanus, give abx and provide pain control.
Single

## 2022-09-30 NOTE — BH PATIENT PROFILE - NSSBIRTDRGSTOPUSE_GEN_A_CORE
3 y.o. WELL CHILD EXAM   [facility]    5-10 YEAR WELL CHILD EXAM    Jersey is a 3 y.o. 3 m.o.male     History given by Father    CONCERNS/QUESTIONS: No    IMMUNIZATIONS: up to date and documented    NUTRITION, ELIMINATION, SLEEP, SOCIAL , SCHOOL     Nutrition Screening:  Does very well with fruits and vegetables.  Does not really like noodles.  Drinks milk, overall not too picky.    MULTIVITAMIN: on occasion    PHYSICAL ACTIVITY/EXERCISE/SPORTS: Very active child.  Will be involved in soccer this fall.    ELIMINATION:   Has good urine output and BM's are soft? Yes    SLEEP PATTERN:   Easy to fall asleep? Yes  Sleeps through the night? Yes    School: Will be starting  this year  Peer relationships: excellent    HISTORY     Patient's medications, allergies, past medical, surgical, social and family histories were reviewed and updated as appropriate.    History reviewed. No pertinent past medical history.    History reviewed. No pertinent family history.  No current outpatient medications on file.     No current facility-administered medications for this visit.     No Known Allergies    REVIEW OF SYSTEMS     Constitutional: Afebrile, good appetite, alert.  HENT: No abnormal head shape, no congestion, no nasal drainage. Denies any headaches or sore throat.   Eyes: Vision appears to be normal.  No crossed eyes.  Respiratory: Negative for any difficulty breathing or chest pain.  Cardiovascular: Negative for changes in color/activity.   Gastrointestinal: Negative for any vomiting, constipation or blood in stool.  Genitourinary: Ample urination, denies dysuria.  Musculoskeletal: Negative for any pain or discomfort with movement of extremities.  Skin: Negative for rash or skin infection.  Neurological: Negative for any weakness or decrease in strength.     Psychiatric/Behavioral: Appropriate for age.     DEVELOPMENTAL SURVEILLANCE :      5- 6 year old:   Balances on 1 foot, hops and  Orthopedic Surgery  Shira Salas  2022  Admit Date:  2022  POD 3 Days Post-Op  S/P Procedure(s):  INTERNAL FIXATION LEFT HIP USING INTRAMEDULLARY NAIL.    Patient resting comfortably in bed.    Pain controlled.  Tolerating oral intake.    Denies nausea or vomiting  Denies chest pain or shortness of breath  No events overnight.     Alert and orient to person, place  Vital Sign Ranges  Temperature Temp  Av.3  F (36.8  C)  Min: 97.3  F (36.3  C)  Max: 99.5  F (37.5  C)   Blood pressure Systolic (24hrs), Av , Min:102 , Max:157        Diastolic (24hrs), Av, Min:51, Max:74      Pulse Pulse  Av.5  Min: 75  Max: 82   Respirations Resp  Av.5  Min: 16  Max: 18   Pulse oximetry SpO2  Av %  Min: 95 %  Max: 97 %       Dressing is clean, dry, and intact.   Minimal erythema of the surrounding skin.   Bilateral calves are soft, non-tender.  Bilateral lower extremity is NVI.  Sensation intact bilateral lower extremities  5/5 motor with resisted dorsi and plantar flexion bilaterally  +Dp pulse    Labs:  Recent Labs   Lab Test 22  0800 22  0800   POTASSIUM 3.7 4.0 4.0     Recent Labs   Lab Test 22  0800 22  0758 22   HGB 11.0* 11.3* 12.5     No results for input(s): INR in the last 11204 hours.  Recent Labs   Lab Test 22  0801 22  1344    233 239       A/P  1. S/p left intertrochanteric femur fracture IM nail, non-operative pubic rami fractures   Continue Lovenox for DVT prophylaxis.     Mobilize with PT/OT    WBAT with walker   Leave dressing intact.     Continue current pain regiment.    2. Disposition   Anticipate d/c to TCU today.    Shima Rodriguez PA-C   "skips? Yes  Is able to tie a knot? No  Can count to 10? Yes  Follows simple directions, is able to listen and attend? Yes  Dresses and undresses self? Yes  Knows age? Yes    SCREENINGS   5- 10  yrs   Visual acuity: 20/40 bilaterally and together. Recommend optometry appointment.     ORAL HEALTH:   Primary water source is deficient in fluoride? Yes  Oral Fluoride Supplementation recommended? Yes   Cleaning teeth twice a day, daily oral fluoride? Yes  Established dental home? Yes      OBJECTIVE      PHYSICAL EXAM:   Reviewed vital signs and growth parameters in EMR.     Pulse 92   Temp 37.4 °C (99.4 °F) (Temporal)   Ht 1.148 m (3' 9.2\")   Wt 22.2 kg (49 lb)   SpO2 96%   BMI 16.86 kg/m²     Height - 80 %ile (Z= 0.85) based on CDC (Boys, 2-20 Years) Stature-for-age data based on Stature recorded on 6/28/2021.  Weight - 86 %ile (Z= 1.08) based on CDC (Boys, 2-20 Years) weight-for-age data using vitals from 6/28/2021.  BMI - 85 %ile (Z= 1.04) based on CDC (Boys, 2-20 Years) BMI-for-age based on BMI available as of 6/28/2021.    General: This is an alert, active child in no distress.   HEAD: Normocephalic, atraumatic.   EYES: PERRL. EOMI. No conjunctival infection or discharge.   EARS: TM’s are transparent with good landmarks. Canals are patent.  NOSE: Nares are patent and free of congestion.  MOUTH: Dentition appears normal without significant decay.  THROAT: Oropharynx has no lesions, moist mucus membranes, without erythema, tonsils normal.   NECK: Supple, no lymphadenopathy or masses.   HEART: Regular rate and rhythm without murmur. Pulses are 2+ and equal.   LUNGS: Clear bilaterally to auscultation, no wheezes or rhonchi. No retractions or distress noted.  ABDOMEN: Normal bowel sounds, soft and non-tender without hepatomegaly or splenomegaly or masses.   GENITALIA: Normal male genitalia.    MUSCULOSKELETAL: Spine is straight. Extremities are without abnormalities. Moves all extremities well with full range of " motion.    NEURO: Oriented x3, cranial nerves intact. Reflexes 2+. Strength 5/5. Normal gait.   SKIN: Intact without significant rash or birthmarks. Skin is warm, dry, and pink.     ASSESSMENT AND PLAN     1. Well Child Exam: Healthy 3 y.o. 3 m.o. male with good growth and development.    BMI in normal range at 85%.    1. Anticipatory guidance was reviewed as above, healthy lifestyle including diet and exercise discussed and Bright Futures handout provided.  2. Return to clinic annually for well child exam or as needed.  3. Immunizations given today: None.  4. Vaccine Information statements given for each vaccine if administered. Discussed benefits and side effects of each vaccine with patient /family, answered all patient /family questions .   5. Multivitamin with 400iu of Vitamin D po qd.  6. Dental exams twice yearly with established dental home.   Yes

## 2022-11-04 NOTE — ED ADULT TRIAGE NOTE - TEMPERATURE IN FAHRENHEIT (DEGREES F)
98.3
This is a 71 year old female with no significant PMH presenting after a mechanical fall and direct blow to L patella. 1/5 hip flexion and knee flexion/extension. Swelling noted on R knee. Will treat with ibuprofen and obtain x-ray.

## 2022-11-29 ENCOUNTER — EMERGENCY (EMERGENCY)
Facility: HOSPITAL | Age: 22
LOS: 1 days | Discharge: HOME | End: 2022-11-29
Attending: STUDENT IN AN ORGANIZED HEALTH CARE EDUCATION/TRAINING PROGRAM
Payer: COMMERCIAL

## 2022-11-29 VITALS
DIASTOLIC BLOOD PRESSURE: 80 MMHG | WEIGHT: 160.06 LBS | TEMPERATURE: 98 F | HEIGHT: 70 IN | RESPIRATION RATE: 18 BRPM | SYSTOLIC BLOOD PRESSURE: 130 MMHG | OXYGEN SATURATION: 100 % | HEART RATE: 80 BPM

## 2022-11-29 VITALS
DIASTOLIC BLOOD PRESSURE: 71 MMHG | OXYGEN SATURATION: 98 % | HEART RATE: 82 BPM | SYSTOLIC BLOOD PRESSURE: 125 MMHG | TEMPERATURE: 98 F | RESPIRATION RATE: 18 BRPM

## 2022-11-29 DIAGNOSIS — F60.89 OTHER SPECIFIC PERSONALITY DISORDERS: ICD-10-CM

## 2022-11-29 DIAGNOSIS — F41.1 GENERALIZED ANXIETY DISORDER: ICD-10-CM

## 2022-11-29 LAB
AMPHET UR-MCNC: NEGATIVE — SIGNIFICANT CHANGE UP
ANION GAP SERPL CALC-SCNC: 10 MMOL/L — SIGNIFICANT CHANGE UP (ref 5–17)
APAP SERPL-MCNC: <15 UG/ML — SIGNIFICANT CHANGE UP (ref 10–30)
APPEARANCE UR: CLEAR — SIGNIFICANT CHANGE UP
BARBITURATES UR SCN-MCNC: NEGATIVE — SIGNIFICANT CHANGE UP
BASOPHILS # BLD AUTO: 0.04 K/UL — SIGNIFICANT CHANGE UP (ref 0–0.2)
BASOPHILS NFR BLD AUTO: 0.4 % — SIGNIFICANT CHANGE UP (ref 0–2)
BENZODIAZ UR-MCNC: POSITIVE
BILIRUB UR-MCNC: NEGATIVE — SIGNIFICANT CHANGE UP
BUN SERPL-MCNC: 18 MG/DL — SIGNIFICANT CHANGE UP (ref 7–23)
CALCIUM SERPL-MCNC: 9.5 MG/DL — SIGNIFICANT CHANGE UP (ref 8.4–10.5)
CHLORIDE SERPL-SCNC: 106 MMOL/L — SIGNIFICANT CHANGE UP (ref 96–108)
CO2 SERPL-SCNC: 22 MMOL/L — SIGNIFICANT CHANGE UP (ref 22–31)
COCAINE METAB.OTHER UR-MCNC: NEGATIVE — SIGNIFICANT CHANGE UP
COLOR SPEC: SIGNIFICANT CHANGE UP
CREAT SERPL-MCNC: 0.94 MG/DL — SIGNIFICANT CHANGE UP (ref 0.5–1.3)
DIFF PNL FLD: NEGATIVE — SIGNIFICANT CHANGE UP
EGFR: 116 ML/MIN/1.73M2 — SIGNIFICANT CHANGE UP
EOSINOPHIL # BLD AUTO: 0.02 K/UL — SIGNIFICANT CHANGE UP (ref 0–0.5)
EOSINOPHIL NFR BLD AUTO: 0.2 % — SIGNIFICANT CHANGE UP (ref 0–6)
ETHANOL SERPL-MCNC: <10 MG/DL — SIGNIFICANT CHANGE UP (ref 0–10)
GLUCOSE SERPL-MCNC: 98 MG/DL — SIGNIFICANT CHANGE UP (ref 70–99)
GLUCOSE UR QL: NEGATIVE — SIGNIFICANT CHANGE UP
HCT VFR BLD CALC: 43.3 % — SIGNIFICANT CHANGE UP (ref 39–50)
HGB BLD-MCNC: 14.3 G/DL — SIGNIFICANT CHANGE UP (ref 13–17)
IMM GRANULOCYTES NFR BLD AUTO: 1 % — HIGH (ref 0–0.9)
KETONES UR-MCNC: NEGATIVE — SIGNIFICANT CHANGE UP
LEUKOCYTE ESTERASE UR-ACNC: NEGATIVE — SIGNIFICANT CHANGE UP
LYMPHOCYTES # BLD AUTO: 1.12 K/UL — SIGNIFICANT CHANGE UP (ref 1–3.3)
LYMPHOCYTES # BLD AUTO: 11.4 % — LOW (ref 13–44)
MCHC RBC-ENTMCNC: 30.6 PG — SIGNIFICANT CHANGE UP (ref 27–34)
MCHC RBC-ENTMCNC: 33 GM/DL — SIGNIFICANT CHANGE UP (ref 32–36)
MCV RBC AUTO: 92.7 FL — SIGNIFICANT CHANGE UP (ref 80–100)
METHADONE UR-MCNC: NEGATIVE — SIGNIFICANT CHANGE UP
MONOCYTES # BLD AUTO: 0.67 K/UL — SIGNIFICANT CHANGE UP (ref 0–0.9)
MONOCYTES NFR BLD AUTO: 6.8 % — SIGNIFICANT CHANGE UP (ref 2–14)
NEUTROPHILS # BLD AUTO: 7.88 K/UL — HIGH (ref 1.8–7.4)
NEUTROPHILS NFR BLD AUTO: 80.2 % — HIGH (ref 43–77)
NITRITE UR-MCNC: NEGATIVE — SIGNIFICANT CHANGE UP
NRBC # BLD: 0 /100 WBCS — SIGNIFICANT CHANGE UP (ref 0–0)
OPIATES UR-MCNC: NEGATIVE — SIGNIFICANT CHANGE UP
OXYCODONE UR-MCNC: NEGATIVE — SIGNIFICANT CHANGE UP
PCP SPEC-MCNC: SIGNIFICANT CHANGE UP
PCP UR-MCNC: NEGATIVE — SIGNIFICANT CHANGE UP
PH UR: 5.5 — SIGNIFICANT CHANGE UP (ref 5–8)
PLATELET # BLD AUTO: 304 K/UL — SIGNIFICANT CHANGE UP (ref 150–400)
POTASSIUM SERPL-MCNC: 4.3 MMOL/L — SIGNIFICANT CHANGE UP (ref 3.5–5.3)
POTASSIUM SERPL-SCNC: 4.3 MMOL/L — SIGNIFICANT CHANGE UP (ref 3.5–5.3)
PROT UR-MCNC: NEGATIVE — SIGNIFICANT CHANGE UP
RBC # BLD: 4.67 M/UL — SIGNIFICANT CHANGE UP (ref 4.2–5.8)
RBC # FLD: 14 % — SIGNIFICANT CHANGE UP (ref 10.3–14.5)
SALICYLATES SERPL-MCNC: <2 MG/DL — LOW (ref 15–30)
SARS-COV-2 RNA SPEC QL NAA+PROBE: SIGNIFICANT CHANGE UP
SODIUM SERPL-SCNC: 138 MMOL/L — SIGNIFICANT CHANGE UP (ref 135–145)
SP GR SPEC: 1.02 — SIGNIFICANT CHANGE UP (ref 1.01–1.02)
THC UR QL: POSITIVE
UROBILINOGEN FLD QL: NEGATIVE — SIGNIFICANT CHANGE UP
WBC # BLD: 9.83 K/UL — SIGNIFICANT CHANGE UP (ref 3.8–10.5)
WBC # FLD AUTO: 9.83 K/UL — SIGNIFICANT CHANGE UP (ref 3.8–10.5)

## 2022-11-29 PROCEDURE — 99285 EMERGENCY DEPT VISIT HI MDM: CPT

## 2022-11-29 PROCEDURE — 85025 COMPLETE CBC W/AUTO DIFF WBC: CPT

## 2022-11-29 PROCEDURE — U0003: CPT

## 2022-11-29 PROCEDURE — 90792 PSYCH DIAG EVAL W/MED SRVCS: CPT | Mod: GC

## 2022-11-29 PROCEDURE — 93005 ELECTROCARDIOGRAM TRACING: CPT

## 2022-11-29 PROCEDURE — 81003 URINALYSIS AUTO W/O SCOPE: CPT

## 2022-11-29 PROCEDURE — 99284 EMERGENCY DEPT VISIT MOD MDM: CPT

## 2022-11-29 PROCEDURE — 80307 DRUG TEST PRSMV CHEM ANLYZR: CPT

## 2022-11-29 PROCEDURE — 80048 BASIC METABOLIC PNL TOTAL CA: CPT

## 2022-11-29 NOTE — ED PROVIDER NOTE - CLINICAL SUMMARY MEDICAL DECISION MAKING FREE TEXT BOX
22M with hx of bipolar, anxiety/depression presenting for psych evaluation due to suicidal ideation. Well appearing, aaox3, calm and cooperative. HD stable, afebrile. Likely passive suicidality in the setting of acute stressor. Low suspicion for acute medical condition. Will get screening labs, psych consult and dispo pending workup and psych eval.

## 2022-11-29 NOTE — ED PROVIDER NOTE - PHYSICAL EXAMINATION
CONSTITUTIONAL: NAD  SKIN: Warm dry, normal skin turgor  HEAD: NCAT  EYES: EOMI, PERRLA, no scleral icterus, conjunctiva pink  NECK: Supple; non tender. Full ROM.  CARD: RRR  RESP: No respiratory distress  ABD: non-distended  MSK: Full ROM, no leg swelling  PSYCH: Cooperative, appropriate.

## 2022-11-29 NOTE — BH CONSULTATION LIAISON ASSESSMENT NOTE - NSICDXPASTMEDICALHX_GEN_ALL_CORE_FT
PAST MEDICAL HISTORY:  Bipolar 1 disorder     Gastritis     Generalized anxiety disorder     Ulcerative colitis

## 2022-11-29 NOTE — BH CONSULTATION LIAISON ASSESSMENT NOTE - NSBHCHARTREVIEWLAB_PSY_A_CORE FT
LABS:                        14.3   9.83  )-----------( 304      ( 29 Nov 2022 15:24 )             43.3     11-29    138  |  106  |  18  ----------------------------<  98  4.3   |  22  |  0.94    Ca    9.5      29 Nov 2022 15:24

## 2022-11-29 NOTE — ED PROVIDER NOTE - NSFOLLOWUPCLINICS_GEN_ALL_ED_FT
[facility]  Behavioral Health  [id number] [place] [address]  Phone: [phone]  Fax:

## 2022-11-29 NOTE — BH CONSULTATION LIAISON ASSESSMENT NOTE - NSBHCONSULTFOLLOWAFTERCARE_PSY_A_CORE FT
Follow-up with Dr. Yepez at next available appt, follow-up with Dr. Marco A Ruby tomorrow 11/30 at 1 pm

## 2022-11-29 NOTE — ED ADULT NURSE NOTE - OBJECTIVE STATEMENT
Pt is a 22 year old male with history of Bipolar DO was BIB EMS from home for SI with no plan. Pt lived on his own in [place], was emp[yed as a  for [employer], lost his job at the end of October. he also mentioned that his friend lost his little brother 2 weeks ago and friends parents had to go to [place] for the incident, pt and his friends stayed at his friends home to show him support, however now pt is expressing that he is stressed and said between that (meaning staying at his friends house whose brother committed suicide) and losing his job has become very stressful. Today he felt he was triggered when mom and sister said bye before they left to go to work. Pt denied past suicide attempt, denied homicidal ideations and considered his SI as passive. Pt denied access to firearms, denied self injurious behavior, adnuts to only smoking marijuana daily  drinks about twice a week and has 2-3 beers each time. Pt denied history of any legal issues

## 2022-11-29 NOTE — ED ADULT TRIAGE NOTE - WEIGHT IN LBS
09/11/20 0619   Pain Assessment   Pain Scale 1 Numeric (0 - 10)   Pain Intensity 1 5   Pain Location 1 Abdomen;Perineum   Pain Description 1 Aching; Sore   Pain Intervention(s) 1 Medication (see MAR)     PRN Motrin 800mg and Oxycodone 5mg for pain per patient request. 160

## 2022-11-29 NOTE — BH CONSULTATION LIAISON ASSESSMENT NOTE - DESCRIPTION
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

## 2022-11-29 NOTE — ED PROVIDER NOTE - NSFOLLOWUPINSTRUCTIONS_ED_ALL_ED_FT
IMPORTANT INSTRUCTIONS FROM Dr. ALFORD:    Please follow up with your personal medical doctor in 24-48 hours.   Bring results from today to your visit.    If you were advised to take any medications - be sure to review the package insert.    If your symptoms change, get worse or if you have any new symptoms, come to the ER right away.  If you have any questions, call the ER at the phone number on this page.

## 2022-11-29 NOTE — BH CONSULTATION LIAISON ASSESSMENT NOTE - HPI (INCLUDE ILLNESS QUALITY, SEVERITY, DURATION, TIMING, CONTEXT, MODIFYING FACTORS, ASSOCIATED SIGNS AND SYMPTOMS)
Maulik Carrera is a 21 yo male, single, domiciled alone in [place], recently lost job as  1 month ago with PPHx of bipolar 1 d/o, RAGHAV, 4 prior inpatient psychiatric hospitalizations, currently in treatment with Dr. Yepez and therapist Dr. Wright, h/o cannabis use, no known violence or aggression and PMH of chronic nausea who presents to ED BIB [facility] after parents called suicide hotline when patient was threatening to kill himself.     Patient reports numerous recent psychosocial stressors, including losing job as  1 month ago, financial trouble, and loss of daily structure. Patient was living alone in [facility] apartment in [place] when his close friend's younger brother committed suicide via OD. He returned to [place] to parent's house 10 days ago to support friend. Patient has been feeling emotionally exhausted recently and spent 3 night sleeping over at friend's place. He reports sleeping between 4-5 hours per night. A few days ago, he was upset because aunt was telling other family members about his unemployment and mother did not intercede. This morning, he slept about 8 hours and woke up too late to catch train back to [place]. He was upset that his mother did not say goodbye to him before leaving for work despite knowing he was returning to apartment. He continued to feel upset throughout the day and went for a drive to clear his mind. While on the phone with his parents, he stated "You make me want to kill myself." He did not have a specific plan or intent in mind when he made the statement.  Parents asked him to return home but patient continued to drive aimlessly as a coping mechanism. He returned home about 30 minutes later and attempted to discuss situation further with parents. Mother had called suicide hotline earlier, and hotline contacted 911 for SI. Police arrived to house and patient complied with transport to ED for psychiatric evaluation.     Today, he denies active or passive SIIP. He reports statement was impulsive and expresses regret. His weekly therapy appointment with Dr. Marco A Wright is scheduled tomorrow at 1 pm. He already alerted therapist that he made suicidal statement. He reports taking his medications consistently, including vraylar and seroquel night before incident. He did not take any klonopin today. He denies any EtOH, cannabis, or other substance use. He denies elevated mood, impulsive decisions, rapid speech, increased energy, paranoia, AVH, or homicidal ideation.    Collateral information obtained from parents, who state patient is unpredictable and believes parents are not doing enough for his mental health. Parents report he was doing well in last 10 days and did not express any SI. Today, he was upset due to discussion about parents assisting patient with finances. He was agitated and saying multiple statements such as "I'm unhappy," "there's no devendra in my life," "get me help," and "do something about this." Parents acknowledge patient frequently makes suicidal statements during arguments but no known suicidal attempts. Parents did not believe patient had intent to hurt himself or others, but mother called suicide hotline for advice on how to speak with son. Parents do not think this situation is different than previous ones, but were worried because of recent completed suicide from family friend so decided to err on side of caution.    Further collateral information obtained from Dr. Yepez, who confirmed patient has a history of emotional dysregulation that is triggered by family dynamics. Patient has threatened suicide in the past during arguments with parents, including plan to jump in front of a train. Patient attends scheduled appointments and recently asked for a refill of medications when he was due.   Maulik Carrera is a 23 yo male, single, domiciled alone in [place], recently lost job as  1 month ago with PPHx of bipolar 1 d/o, RAGHAV, 4 prior inpatient psychiatric hospitalizations, currently in treatment with Dr. Yepez and therapist Dr. Wright, h/o cannabis use, no known violence or aggression and PMH of chronic nausea who presents to ED BIB [facility] after parents called suicide hotline when patient was threatening to kill himself.     Patient reports numerous recent psychosocial stressors, including losing job as  1 month ago, financial trouble, and loss of daily structure. Patient was living alone in [facility] apartment in [place] when his close friend's younger brother committed suicide via OD. He returned to [place] to parent's house 10 days ago to support friend. Patient has been feeling emotionally exhausted recently and spent 3 night sleeping over at friend's place. He reports sleeping between 4-5 hours per night. A few days ago, he was upset because aunt was telling other family members about his unemployment and mother did not intercede. This morning, he slept about 8 hours and woke up too late to catch train back to [place]. He was upset that his mother did not say goodbye to him before leaving for work despite knowing he was returning to apartment. He continued to feel upset throughout the day and went for a drive to clear his mind. While on the phone with his parents, he stated "You make me want to kill myself." He did not have a specific plan or intent in mind when he made the statement.  Parents asked him to return home but patient continued to drive aimlessly as a coping mechanism. He returned home about 30 minutes later and attempted to discuss situation further with parents. Mother had called suicide hotline earlier, and hotline contacted 911 for SI. Police arrived to house and patient complied with transport to ED for psychiatric evaluation.     Today, he denies active or passive SIIP. He reports statement was impulsive and expresses regret. His weekly therapy appointment with Dr. Marco A Wright is scheduled tomorrow at 1 pm. He already alerted therapist that he made suicidal statement. He reports taking his medications consistently, including vraylar and seroquel night before incident. He did not take any klonopin today. He denies any EtOH, cannabis, or other substance use. He denies elevated mood, impulsive decisions, rapid speech, increased energy, paranoia, AVH, or homicidal ideation.    Collateral information obtained from parents, who state patient is unpredictable and believes parents are not doing enough for his mental health. Parents report he was doing well in last 10 days and did not express any SI. Today, he was upset due to discussion about parents assisting patient with finances. He was agitated and saying multiple statements such as "I'm unhappy," "there's no devendra in my life," "get me help," and "do something about this." Parents acknowledge patient frequently makes suicidal statements during arguments but no known suicidal attempts. Parents did not believe patient had intent to hurt himself or others, but mother called suicide hotline for advice on how to speak with son. Parents do not think this situation is different than previous ones, but were worried because of recent completed suicide from family friend so decided to err on side of caution.    Further collateral information obtained from Dr. Yepez, who confirmed patient has a history of emotional dysregulation that is triggered by family dynamics. Patient has threatened suicide in the past during arguments with parents, including plan to jump in front of a train. He walked to train station in 2021 and was admitted to [facility] for SI. Patient attends scheduled appointments and recently asked for a refill of medications when he was due.

## 2022-11-29 NOTE — ED PROVIDER NOTE - ATTENDING CONTRIBUTION TO CARE
I, Bryan Monzon, performed a history and physical exam of the patient and discussed their management with the resident and /or advanced care provider. I reviewed the resident and /or ACP's note and agree with the documented findings and plan of care. I was present and available for all procedures.

## 2022-11-29 NOTE — ED ADULT NURSE NOTE - NSIMPLEMENTINTERV_GEN_ALL_ED
Implemented All Universal Safety Interventions:  [place] to call system. Call bell, personal items and telephone within reach. Instruct patient to call for assistance. Room bathroom lighting operational. Non-slip footwear when patient is off stretcher. Physically safe environment: no spills, clutter or unnecessary equipment. Stretcher in lowest position, wheels locked, appropriate side rails in place.

## 2022-11-29 NOTE — BH CONSULTATION LIAISON ASSESSMENT NOTE - NSBHCHARTREVIEWVS_PSY_A_CORE FT
Vital Signs Last 24 Hrs  T(C): 36.7 (29 Nov 2022 13:44), Max: 36.7 (29 Nov 2022 13:44)  T(F): 98 (29 Nov 2022 13:44), Max: 98 (29 Nov 2022 13:44)  HR: 80 (29 Nov 2022 13:44) (80 - 80)  BP: 130/80 (29 Nov 2022 13:44) (130/80 - 130/80)  BP(mean): --  RR: 18 (29 Nov 2022 13:44) (18 - 18)  SpO2: 100% (29 Nov 2022 13:44) (100% - 100%)    Parameters below as of 29 Nov 2022 13:44  Patient On (Oxygen Delivery Method): room air

## 2022-11-29 NOTE — BH CONSULTATION LIAISON ASSESSMENT NOTE - RISK ASSESSMENT
Risk factors: h/o SI, h/o psych admissions, active cannabis use, recent loss of childhood friend's brother    Protective factors: denies current SIIP/HIIP, no access to weapons, good physical health, stable housing, social supports (family), positive therapeutic relationship, engaged in treatment, compliant with treatment, help-seeking behaviors    Overall, pt is at low acute risk of harm and does not meet criteria for psychiatric admission.

## 2022-11-29 NOTE — BH CONSULTATION LIAISON ASSESSMENT NOTE - NSBHATTESTCOMMENTATTENDFT_PSY_A_CORE
This is a 22-y.o. CM patient, single, domiciled alone in [place], recently lost job as  1 month ago with PPHx of bipolar 1 d/o, RAGHAV, 4 prior inpatient psychiatric hospitalizations, currently in treatment with Dr. Yepez and therapist Dr. Wright, h/o cannabis use, no known violence or aggression and PMH of chronic nausea who presents to ED BIB [facility] after parents called suicide hotline when patient was threatening to kill himself.     Patient reports numerous recent psychosocial stressors, including losing job as  1 month ago, financial difficulties, and recent suicide of friend's brother. He was emotionally exhausted in past few days and argued with parents about wanting more support from them.  While on the phone with his parents, he stated "You make me want to kill myself." He did not have a specific plan or intent in mind when he made the statement. Mother had called suicide hotline, and hotline contacted 911 for SI.    I have seen and evaluated this patient myself. Chart, labs, meds reviewed. I agree with resident's assessment and plan.

## 2022-11-29 NOTE — ED PROVIDER NOTE - PROGRESS NOTE DETAILS
psych consulted Endorsed to me by Dr. Adams.  Discussed with psychiatry.  They recommend for discharge admit the patient made a gesture and is not actively suicidal..  At this time patient does not qualify for involuntary admission.  We will discharge the patient for outpatient care.

## 2022-11-29 NOTE — ED PROVIDER NOTE - PATIENT PORTAL LINK FT
You can access the FollowMyHealth Patient Portal offered by [facility] by registering at the following website: [email or web address]. By joining [facility]’s FollowMyHealth portal, you will also be able to view your health information using other applications (apps) compatible with our system.

## 2022-11-29 NOTE — ED PROVIDER NOTE - NS ED ROS FT
See Eval and POC for details.     Constitutional:  (-) fever, (-) chills, (-) fatigue  Eyes:  (-) eye pain (-) visual changes  ENMT: (-) nasal discharge, (-) sore throat. (-) neck pain or stiffness  Cardiac: (-) chest pain (-) palpitations  Respiratory:  (-) cough (-) shortness of breath  GI:  (-) nausea (-) vomiting (-) diarrhea (-) abdominal pain  :  (-) dysuria (-) frequency (-) burning  MS:  (-) back pain (-) joint pain  Neuro:  (-) headache (-) numbness (-) tingling (-) focal weakness  Skin:  (-) rash  Except as documented in the HPI,  all other systems are negative

## 2022-11-29 NOTE — ED PROVIDER NOTE - OBJECTIVE STATEMENT
23 yo M with hx of bipolar, IBS, anxiety presenting with suicidal ideation. Patient is in town visiting and was planning on returning home today. He became upset today because his mother did not say goodbye to him in the morning. This led to an argument during which he said "you make me want to kill myself." States that he immediately regretted saying it because he did not have suicidal intentions or plan for doing so. Patient reports ongoing stressors including the recent death of a close friend via intentional overdose. Patient spoke with his private psychiatrist Dr. Yepez and recommended coming to the ER for evaluation as he has had suicidal thoughts in the past, no attempts. Patient denies complaints at this time, including SI/HI/AH/VH. Does not have access to firearms in the house. Smoked marijuana last night, denies alcohol use or additional illicit drug use.

## 2023-11-10 NOTE — ED ADULT NURSE NOTE - PRO INTERPRETER NEED 2
Pt informed through VM. glucose blood VI test strips (EXACTECH TEST) strip 1 each by Does not apply route daily Test once daily and as needed. 100 each 3    glucose monitoring kit (FREESTYLE) monitoring kit 1 kit by Does not apply route daily as needed. 1 kit 0    latanoprost (XALATAN) 0.005 % ophthalmic solution Place 1 drop into both eyes nightly.  ondansetron (ZOFRAN ODT) 4 MG disintegrating tablet Dissolve one tablet on tongue and swallow every 8 hours as needed for nausea. 30 tablet 0     No current facility-administered medications for this visit. Review of Systems   Constitutional: Negative for chills and fever. Respiratory: Negative for shortness of breath. Cardiovascular: Negative for chest pain. Gastrointestinal: Negative for abdominal distention, blood in stool, constipation, diarrhea, nausea and vomiting. Neurological: Positive for numbness. Severe diabetic neuropathy bilateral feet   All other systems reviewed and are negative. Objective:   Physical Exam   Constitutional: He is oriented to person, place, and time. He appears well-developed and well-nourished. No distress. HENT:   Head: Normocephalic and atraumatic. Eyes: Conjunctivae are normal. No scleral icterus. Neck: Neck supple. No JVD present. Cardiovascular: Normal rate and regular rhythm. Pulses:       Dorsalis pedis pulses are 1+ on the right side, and 1+ on the left side. Posterior tibial pulses are 1+ on the right side, and 1+ on the left side. Pulmonary/Chest: Effort normal and breath sounds normal.   Abdominal: Soft. There is no rigidity, no rebound and no guarding. Musculoskeletal: Normal range of motion. He exhibits edema (1+ BLE). Neurological: He is alert and oriented to person, place, and time. Skin: Skin is warm and dry. He is not diaphoretic. Psychiatric: He has a normal mood and affect.  His behavior is normal. Thought content normal.     /80 (Site: Left Arm, Position: Sitting)   Pulse English 71

## 2024-10-03 ENCOUNTER — NON-APPOINTMENT (OUTPATIENT)
Age: 24
End: 2024-10-03

## 2024-11-18 NOTE — BH CONSULTATION LIAISON ASSESSMENT NOTE - SUMMARY
Statement Selected Maulik Carrera is a 21 yo male, single, domiciled alone in [place], recently lost job as  1 month ago with PPHx of bipolar 1 d/o, RAGHAV, 4 prior inpatient psychiatric hospitalizations, currently in treatment with Dr. Yepez and therapist Dr. Wright, h/o cannabis use, no known violence or aggression and PMH of chronic nausea who presents to ED BIB [facility] after parents called suicide hotline when patient was threatening to kill himself.     Patient reports numerous recent psychosocial stressors, including losing job as  1 month ago, financial trouble, and loss of daily structure. Patient was living alone in [facility] apartment in [place] when his close friend's younger brother committed suicide via OD. He returned to [place] to parent's house 10 days ago to support friend. Patient has been feeling emotionally exhausted recently and spent 3 night sleeping over at friend's place. He reports sleeping between 4-5 hours per night. A few days ago, he was upset because aunt was telling other family members about his unemployment and mother did not intercede. This morning, he slept about 8 hours and woke up too late to catch train back to [place]. He was upset that his mother did not say goodbye to him before leaving for work despite knowing he was returning to apartment. He continued to feel upset throughout the day and went for a drive to clear his mind. While on the phone with his parents, he stated "You make me want to kill myself." He did not have a specific plan or intent in mind when he made the statement.  Parents asked him to return home but patient continued to drive aimlessly as a coping mechanism. He returned home about 30 minutes later and attempted to discuss situation further with parents. Mother had called suicide hotline earlier, and hotline contacted 911 for SI. Police arrived to house and patient complied with transport to ED for psychiatric evaluation.     Today, patient denies active or passive S/IIP. He regrets impulsive statement and agrees to follow-up with therapist tomorrow. Parents called for collateral and reported patient frequently makes suicidal statements during arguments but has not seemed depressed recently.  Maulik Carrera is a 23 yo male, single, domiciled alone in [place], recently lost job as  1 month ago with PPHx of bipolar 1 d/o, RAGHAV, 4 prior inpatient psychiatric hospitalizations, currently in treatment with Dr. Yepez and therapist Dr. Wright, h/o cannabis use, no known violence or aggression and PMH of chronic nausea who presents to ED BIB [facility] after parents called suicide hotline when patient was threatening to kill himself.     Patient reports numerous recent psychosocial stressors, including losing job as  1 month ago, financial difficulties, and recent suicide of friend's brother. He was emotionally exhausted in past few days and argued with parents about wanting more support from them.  While on the phone with his parents, he stated "You make me want to kill myself." He did not have a specific plan or intent in mind when he made the statement. Mother had called suicide hotline, and hotline contacted 911 for SI.     Today, patient denies active or passive S/IIP. He regrets impulsive statement and agrees to follow-up with therapist tomorrow. Parents and psychiatrist called for collateral. They note patient has history of emotional dysregulation and impulsive statements.     Plan  - Patient is not a danger to self or others and does not need inpatient psych hospitalization  - Patient is cleared for discharge with close outpatient follow-up  - Constant observation for suicidal statement while in ED  - Continue with home medications of vraylar, seroquel, and PRN klonopin